# Patient Record
Sex: FEMALE | Race: WHITE | NOT HISPANIC OR LATINO | Employment: UNEMPLOYED | URBAN - METROPOLITAN AREA
[De-identification: names, ages, dates, MRNs, and addresses within clinical notes are randomized per-mention and may not be internally consistent; named-entity substitution may affect disease eponyms.]

---

## 2019-06-05 ENCOUNTER — HOSPITAL ENCOUNTER (OUTPATIENT)
Dept: GENERAL RADIOLOGY | Facility: HOSPITAL | Age: 39
Discharge: HOME | End: 2019-06-05
Attending: FAMILY MEDICINE

## 2019-08-23 ENCOUNTER — OFFICE VISIT CONVERTED (OUTPATIENT)
Dept: SURGERY | Facility: CLINIC | Age: 39
End: 2019-08-23
Attending: SURGERY

## 2019-09-05 ENCOUNTER — HOSPITAL ENCOUNTER (OUTPATIENT)
Dept: PERIOP | Facility: HOSPITAL | Age: 39
Setting detail: HOSPITAL OUTPATIENT SURGERY
Discharge: HOME | End: 2019-09-05
Attending: SURGERY

## 2019-09-20 ENCOUNTER — OFFICE VISIT CONVERTED (OUTPATIENT)
Dept: SURGERY | Facility: CLINIC | Age: 39
End: 2019-09-20
Attending: SURGERY

## 2020-10-26 ENCOUNTER — HOSPITAL ENCOUNTER (OUTPATIENT)
Dept: MRI IMAGING | Facility: HOSPITAL | Age: 40
Discharge: HOME | End: 2020-10-26
Attending: FAMILY MEDICINE

## 2020-10-28 ENCOUNTER — HOSPITAL ENCOUNTER (OUTPATIENT)
Dept: CARDIOLOGY | Facility: HOSPITAL | Age: 40
Discharge: HOME | End: 2020-10-28
Attending: FAMILY MEDICINE

## 2021-05-15 VITALS — RESPIRATION RATE: 16 BRPM | WEIGHT: 256.5 LBS | BODY MASS INDEX: 40.26 KG/M2 | HEIGHT: 67 IN

## 2021-05-15 VITALS — HEIGHT: 67 IN | WEIGHT: 255 LBS | BODY MASS INDEX: 40.02 KG/M2 | RESPIRATION RATE: 16 BRPM

## 2021-08-16 ENCOUNTER — HOSPITAL ENCOUNTER (EMERGENCY)
Facility: HOSPITAL | Age: 41
Discharge: HOME | End: 2021-08-16
Attending: EMERGENCY MEDICINE | Admitting: EMERGENCY MEDICINE

## 2021-08-16 VITALS
TEMPERATURE: 98.7 F | HEART RATE: 82 BPM | BODY MASS INDEX: 38.41 KG/M2 | HEIGHT: 67 IN | DIASTOLIC BLOOD PRESSURE: 82 MMHG | RESPIRATION RATE: 18 BRPM | SYSTOLIC BLOOD PRESSURE: 112 MMHG | OXYGEN SATURATION: 95 % | WEIGHT: 244.71 LBS

## 2021-08-16 DIAGNOSIS — T78.40XA ALLERGIC DISORDER, INITIAL ENCOUNTER: Primary | ICD-10-CM

## 2021-08-16 PROCEDURE — 96375 TX/PRO/DX INJ NEW DRUG ADDON: CPT

## 2021-08-16 PROCEDURE — 94640 AIRWAY INHALATION TREATMENT: CPT

## 2021-08-16 PROCEDURE — 25010000002 METHYLPREDNISOLONE PER 125 MG

## 2021-08-16 PROCEDURE — 94799 UNLISTED PULMONARY SVC/PX: CPT

## 2021-08-16 PROCEDURE — 99283 EMERGENCY DEPT VISIT LOW MDM: CPT

## 2021-08-16 PROCEDURE — 96374 THER/PROPH/DIAG INJ IV PUSH: CPT

## 2021-08-16 PROCEDURE — 25010000002 DIPHENHYDRAMINE PER 50 MG

## 2021-08-16 RX ORDER — DOXYCYCLINE HYCLATE 100 MG/1
100 TABLET, DELAYED RELEASE ORAL 2 TIMES DAILY
Qty: 20 TABLET | Refills: 0 | Status: SHIPPED | OUTPATIENT
Start: 2021-08-16 | End: 2021-11-15

## 2021-08-16 RX ORDER — SERTRALINE HYDROCHLORIDE 25 MG/1
25 TABLET, FILM COATED ORAL DAILY
COMMUNITY
End: 2021-11-15 | Stop reason: SDUPTHER

## 2021-08-16 RX ORDER — METHYLPREDNISOLONE SODIUM SUCCINATE 125 MG/2ML
125 INJECTION, POWDER, LYOPHILIZED, FOR SOLUTION INTRAMUSCULAR; INTRAVENOUS ONCE
Status: COMPLETED | OUTPATIENT
Start: 2021-08-16 | End: 2021-08-16

## 2021-08-16 RX ORDER — FAMOTIDINE 20 MG/1
20 TABLET, FILM COATED ORAL 2 TIMES DAILY
Qty: 20 TABLET | Refills: 0 | Status: SHIPPED | OUTPATIENT
Start: 2021-08-16 | End: 2021-11-15

## 2021-08-16 RX ORDER — DIPHENHYDRAMINE HCL 50 MG
50 CAPSULE ORAL EVERY 6 HOURS PRN
Qty: 30 CAPSULE | Refills: 0 | Status: SHIPPED | OUTPATIENT
Start: 2021-08-16 | End: 2021-11-15

## 2021-08-16 RX ORDER — METHYLPREDNISOLONE SODIUM SUCCINATE 125 MG/2ML
INJECTION, POWDER, LYOPHILIZED, FOR SOLUTION INTRAMUSCULAR; INTRAVENOUS
Status: COMPLETED
Start: 2021-08-16 | End: 2021-08-16

## 2021-08-16 RX ORDER — DIPHENHYDRAMINE HYDROCHLORIDE 50 MG/ML
50 INJECTION INTRAMUSCULAR; INTRAVENOUS ONCE
Status: COMPLETED | OUTPATIENT
Start: 2021-08-16 | End: 2021-08-16

## 2021-08-16 RX ORDER — PREDNISONE 20 MG/1
20 TABLET ORAL 2 TIMES DAILY
Qty: 14 TABLET | Refills: 0 | Status: SHIPPED | OUTPATIENT
Start: 2021-08-16 | End: 2021-11-15

## 2021-08-16 RX ORDER — FAMOTIDINE 10 MG/ML
20 INJECTION, SOLUTION INTRAVENOUS ONCE
Status: COMPLETED | OUTPATIENT
Start: 2021-08-16 | End: 2021-08-16

## 2021-08-16 RX ORDER — ALBUTEROL SULFATE 2.5 MG/3ML
2.5 SOLUTION RESPIRATORY (INHALATION) ONCE
Status: COMPLETED | OUTPATIENT
Start: 2021-08-16 | End: 2021-08-16

## 2021-08-16 RX ORDER — ASPIRIN 81 MG/1
81 TABLET, CHEWABLE ORAL DAILY
COMMUNITY

## 2021-08-16 RX ORDER — DIPHENHYDRAMINE HYDROCHLORIDE 50 MG/ML
INJECTION INTRAMUSCULAR; INTRAVENOUS
Status: COMPLETED
Start: 2021-08-16 | End: 2021-08-16

## 2021-08-16 RX ORDER — METOPROLOL TARTRATE 100 MG/1
100 TABLET ORAL 2 TIMES DAILY
COMMUNITY
End: 2021-11-15

## 2021-08-16 RX ADMIN — METHYLPREDNISOLONE SODIUM SUCCINATE 125 MG: 125 INJECTION, POWDER, FOR SOLUTION INTRAMUSCULAR; INTRAVENOUS at 15:09

## 2021-08-16 RX ADMIN — METHYLPREDNISOLONE SODIUM SUCCINATE 125 MG: 125 INJECTION, POWDER, LYOPHILIZED, FOR SOLUTION INTRAMUSCULAR; INTRAVENOUS at 15:09

## 2021-08-16 RX ADMIN — DIPHENHYDRAMINE HYDROCHLORIDE 50 MG: 50 INJECTION, SOLUTION INTRAMUSCULAR; INTRAVENOUS at 15:10

## 2021-08-16 RX ADMIN — DIPHENHYDRAMINE HYDROCHLORIDE 50 MG: 50 INJECTION INTRAMUSCULAR; INTRAVENOUS at 15:10

## 2021-08-16 RX ADMIN — FAMOTIDINE 20 MG: 10 INJECTION INTRAVENOUS at 15:30

## 2021-08-16 RX ADMIN — ALBUTEROL SULFATE 2.5 MG: 2.5 SOLUTION RESPIRATORY (INHALATION) at 15:46

## 2021-08-16 NOTE — ED PROVIDER NOTES
"Time: 2:56 PM EDT  Arrived by: private vehicle; accompanied by family   Chief Complaint: ALLERGIC REACTION   History provided by: pt  History is limited by: N/A    History of Present Illness:  Patient is a 41 y.o. year old female that presents to the emergency department with ALLERGIC REACTION. This started today and is still present and constant. It is described as moderate in severity. Nothing improves or worsens symptoms.     Pt has skin rash and states that she feels like her throat is \"tightening up\". She c/o SOB. Pt took Bactrim at 13:30 today. She notes that she has taken it before with no issues.     Pt was seen by her PCP today for a sore throat and was started on Bactrim.     Pt has hx of asthma. Pt is a smoker.       History provided by:  Patient      Similar Symptoms Previously: no  Recently seen: Pt was seen in PCP's office today.     Patient Care Team  Primary Care Provider: Deion Wang MD     Past Medical History:     Allergies   Allergen Reactions   • Latex Itching     Past Medical History:   Diagnosis Date   • Asthma    • Cellulitis      History reviewed. No pertinent surgical history.  History reviewed. No pertinent family history.    Home Medications:  Prior to Admission medications    Medication Sig Start Date End Date Taking? Authorizing Provider   aspirin 81 MG chewable tablet Chew 81 mg Daily.   Yes ProviderAnnabel MD   metoprolol tartrate (LOPRESSOR) 100 MG tablet Take 100 mg by mouth 2 (Two) Times a Day.   Yes ProviderAnnabel MD   sertraline (Zoloft) 25 MG tablet Take 25 mg by mouth Daily.   Yes ProviderAnnabel MD        Social History:   PT  reports that she has been smoking. She has been smoking about 0.50 packs per day. She has never used smokeless tobacco. She reports previous alcohol use. She reports current drug use. Drug: Marijuana.    Record Review:  I have reviewed the patient's records in [facility].     Review of Systems  Review of Systems   Constitutional: " "Negative for chills, diaphoresis and fever.   HENT: Positive for sore throat. Negative for ear discharge and nosebleeds.    Eyes: Negative for photophobia.   Respiratory: Positive for shortness of breath.    Cardiovascular: Negative for chest pain.   Gastrointestinal: Negative for diarrhea, nausea and vomiting.   Genitourinary: Negative for dysuria.   Musculoskeletal: Negative for back pain and neck pain.   Skin: Positive for rash.   Neurological: Negative for headaches.        Physical Exam  /82   Pulse 82   Temp 98.7 °F (37.1 °C) (Oral)   Resp 18   Ht 170.2 cm (67\")   Wt 111 kg (244 lb 11.4 oz)   SpO2 95%   BMI 38.33 kg/m²     Physical Exam  Vitals and nursing note reviewed.   Constitutional:       General: She is not in acute distress.     Appearance: Normal appearance.   HENT:      Head: Normocephalic and atraumatic.      Nose: Nose normal.   Eyes:      General: No scleral icterus.  Cardiovascular:      Rate and Rhythm: Normal rate and regular rhythm.      Heart sounds: Normal heart sounds.   Pulmonary:      Effort: Pulmonary effort is normal. No respiratory distress.      Breath sounds: Wheezing present.   Abdominal:      Palpations: Abdomen is soft.      Tenderness: There is no abdominal tenderness.   Musculoskeletal:         General: Normal range of motion.      Cervical back: Neck supple.      Right lower leg: No edema.      Left lower leg: No edema.   Skin:     General: Skin is warm and dry.      Comments: diffuse macular blanching erythema    Neurological:      General: No focal deficit present.      Mental Status: She is alert and oriented to person, place, and time.      Sensory: No sensory deficit.      Motor: No weakness.                  ED Course  /82   Pulse 82   Temp 98.7 °F (37.1 °C) (Oral)   Resp 18   Ht 170.2 cm (67\")   Wt 111 kg (244 lb 11.4 oz)   SpO2 95%   BMI 38.33 kg/m²   No results found for this or any previous visit.  Medications   diphenhydrAMINE (BENADRYL) " injection 50 mg (50 mg Intravenous Given 8/16/21 1510)   methylPREDNISolone sodium succinate (SOLU-Medrol) injection 125 mg (125 mg Intravenous Given 8/16/21 1509)   famotidine (PEPCID) injection 20 mg (20 mg Intravenous Given 8/16/21 1530)   albuterol (PROVENTIL) nebulizer solution 0.083% 2.5 mg/3mL (2.5 mg Nebulization Given 8/16/21 1546)     No results found.    Procedures/EKGs:  Procedures      Medical Decision Making:                     MDM  Number of Diagnoses or Management Options  Allergic disorder, initial encounter  Diagnosis management comments: Patient presents with rash and some difficulty breathing that began after taking her first dose of Bactrim for facial infection.  Differential considerations include anaphylaxis versus urticaria versus angioedema.  She received IV steroids and antihistamines with symptomatic improvement and resolution was discharged stable with steroids and antihistamines prescribed.  She is to return for worsening dyspnea other concerns she is advised to discontinue the Bactrim.    Risk of Complications, Morbidity, and/or Mortality  Presenting problems: high    Patient Progress  Patient progress: resolved       Final diagnoses:   Allergic disorder, initial encounter        Disposition:  ED Disposition     ED Disposition Condition Comment    Discharge Stable           Documentation assistance provided by Victoria Hickey acting as scribe for Jhon Scanlon MD. Information recorded by the scribe was done at my direction and has been verified and validated by me.         Victoria Hickey  08/16/21 0751       Jhon Scanlon MD  08/16/21 1930

## 2021-08-16 NOTE — ED TRIAGE NOTES
"Patient came into the ED today because she started having an allergic reaction to Bactrim. Patient states, \"I took bactrim @ 1:30pm today. I started itching all over, my feet are swelling, and my throat feels like its tightening up.\"  "

## 2021-11-09 PROBLEM — N64.52 NIPPLE DISCHARGE: Status: ACTIVE | Noted: 2021-11-09

## 2021-11-09 PROBLEM — J45.909 ASTHMA: Status: ACTIVE | Noted: 2021-11-09

## 2021-11-09 PROBLEM — N63.0 BREAST MASS: Status: ACTIVE | Noted: 2021-11-09

## 2021-11-09 PROBLEM — M47.816 LUMBAR FACET ARTHROPATHY: Status: ACTIVE | Noted: 2017-07-13

## 2021-11-15 ENCOUNTER — OFFICE VISIT (OUTPATIENT)
Dept: FAMILY MEDICINE CLINIC | Facility: CLINIC | Age: 41
End: 2021-11-15

## 2021-11-15 VITALS
BODY MASS INDEX: 42.97 KG/M2 | HEIGHT: 67 IN | DIASTOLIC BLOOD PRESSURE: 74 MMHG | SYSTOLIC BLOOD PRESSURE: 126 MMHG | HEART RATE: 86 BPM | WEIGHT: 273.8 LBS | TEMPERATURE: 98.7 F | OXYGEN SATURATION: 96 %

## 2021-11-15 DIAGNOSIS — Z12.31 ENCOUNTER FOR SCREENING MAMMOGRAM FOR MALIGNANT NEOPLASM OF BREAST: ICD-10-CM

## 2021-11-15 DIAGNOSIS — R73.01 IMPAIRED FASTING GLUCOSE: ICD-10-CM

## 2021-11-15 DIAGNOSIS — F41.9 ANXIETY: ICD-10-CM

## 2021-11-15 DIAGNOSIS — R23.2 HOT FLASHES: ICD-10-CM

## 2021-11-15 DIAGNOSIS — I10 PRIMARY HYPERTENSION: Primary | ICD-10-CM

## 2021-11-15 DIAGNOSIS — E53.8 VITAMIN B12 DEFICIENCY: ICD-10-CM

## 2021-11-15 DIAGNOSIS — R60.9 PERIPHERAL EDEMA: ICD-10-CM

## 2021-11-15 PROBLEM — R60.0 PERIPHERAL EDEMA: Status: ACTIVE | Noted: 2021-11-15

## 2021-11-15 LAB
CHOLEST SERPL-MCNC: 177 MG/DL (ref 0–200)
HBA1C MFR BLD: 6.6 % (ref 4.8–5.6)
HDLC SERPL-MCNC: 35 MG/DL (ref 40–60)
LDLC SERPL CALC-MCNC: 121 MG/DL (ref 0–100)
LDLC/HDLC SERPL: 3.39 {RATIO}
TRIGL SERPL-MCNC: 117 MG/DL (ref 0–150)
VLDLC SERPL-MCNC: 21 MG/DL (ref 5–40)

## 2021-11-15 PROCEDURE — 80061 LIPID PANEL: CPT | Performed by: NURSE PRACTITIONER

## 2021-11-15 PROCEDURE — 82746 ASSAY OF FOLIC ACID SERUM: CPT | Performed by: NURSE PRACTITIONER

## 2021-11-15 PROCEDURE — 83036 HEMOGLOBIN GLYCOSYLATED A1C: CPT | Performed by: NURSE PRACTITIONER

## 2021-11-15 PROCEDURE — 99204 OFFICE O/P NEW MOD 45 MIN: CPT | Performed by: NURSE PRACTITIONER

## 2021-11-15 PROCEDURE — 82607 VITAMIN B-12: CPT | Performed by: NURSE PRACTITIONER

## 2021-11-15 PROCEDURE — 80053 COMPREHEN METABOLIC PANEL: CPT | Performed by: NURSE PRACTITIONER

## 2021-11-15 PROCEDURE — 36415 COLL VENOUS BLD VENIPUNCTURE: CPT | Performed by: NURSE PRACTITIONER

## 2021-11-15 RX ORDER — SPIRONOLACTONE 25 MG/1
25 TABLET ORAL DAILY
Qty: 30 TABLET | Refills: 2 | Status: SHIPPED | OUTPATIENT
Start: 2021-11-15 | End: 2022-02-28

## 2021-11-15 RX ORDER — SYRINGE W-NEEDLE,DISPOSAB,3 ML 25GX5/8"
1 SYRINGE, EMPTY DISPOSABLE MISCELLANEOUS
Qty: 3 EACH | Refills: 3 | Status: SHIPPED | OUTPATIENT
Start: 2021-11-15 | End: 2022-06-09 | Stop reason: SDUPTHER

## 2021-11-15 RX ORDER — METOPROLOL SUCCINATE 100 MG/1
100 TABLET, EXTENDED RELEASE ORAL DAILY
Qty: 90 TABLET | Refills: 1 | Status: SHIPPED | OUTPATIENT
Start: 2021-11-15 | End: 2021-12-10

## 2021-11-15 RX ORDER — ALBUTEROL SULFATE 90 UG/1
2 AEROSOL, METERED RESPIRATORY (INHALATION) EVERY 4 HOURS PRN
COMMUNITY
End: 2023-03-17 | Stop reason: SDUPTHER

## 2021-11-15 RX ORDER — CYANOCOBALAMIN 1000 UG/ML
1000 INJECTION, SOLUTION INTRAMUSCULAR; SUBCUTANEOUS
Qty: 3 ML | Refills: 3 | Status: SHIPPED | OUTPATIENT
Start: 2021-11-15 | End: 2022-12-07 | Stop reason: SDUPTHER

## 2021-11-15 RX ORDER — CYANOCOBALAMIN 1000 UG/ML
1000 INJECTION, SOLUTION INTRAMUSCULAR; SUBCUTANEOUS ONCE
COMMUNITY
End: 2021-11-15 | Stop reason: SDUPTHER

## 2021-11-15 NOTE — PROGRESS NOTES
"Chief Complaint  Establish Care and Hypertension    Subjective          Nadiya Rodríguez presents to [facility]  History of Present Illness  She presents today as a new patient to establish care.  Her previous PCP was Dr. Wang who is retiring.    She states that earlier this year she passed out at home and her  found her when he returned home.  She said they thought she might of had a stroke because it is affected her left side but they were unable to determine the cause.  They believe that she was having anxiety and she was started on Zoloft which she says has helped.  She is currently on Zoloft 25 mg daily and states it is helping.  She does believe that the dose could be increased some.  She states it did find that she had low vitamin B12, her B12 level was 248.  She was started on vitamin B12 injections and was given them to her self.  She states that neurology would not refill her B12 until she had some levels drawn so she has not had a vitamin B12 injection for about 6 weeks.  She states that the vitamin B12 has seemed to help her symptoms.  She does still complain of some \"brain fog.\"    She is complaining of some swelling in her left lower extremity and mildly in her hands.  She states that she was tried on hydrochlorothiazide in the past and she did not tolerate that.  She also has an allergy to Lasix.    Hypertension: She is currently stable on metoprolol  mg daily.  Her blood pressure today is 126/74.  She states she does have a history of a heart murmur.    She has a history of asthma and uses albuterol inhaler only as needed.    I reviewed her previous labs from 4/20/2021.  Her A1c was noted to be 5.8%.  I discussed with her that she has borderline diabetic, she was unaware.    She states she has an umbilical hernia just below her umbilicus for the past 18 years.  She states is recently gotten worse with pain but she does not want to be referred " "at this time for surgery.    She is also complaining of hot flashes lately.  She is wanting to know what kind of treatment she can get for this.  She states she does not really want to do hormone therapy.  Her last Pap smear was 3 years ago and was normal.  Her last mammogram was 3 to 4 years ago.  She does have a family history of breast cancer.  Objective   Vital Signs:   /74   Pulse 86   Temp 98.7 °F (37.1 °C)   Ht 170.2 cm (67\")   Wt 124 kg (273 lb 12.8 oz)   SpO2 96%   BMI 42.88 kg/m²     Physical Exam  Vitals reviewed.   Constitutional:       Appearance: Normal appearance. She is well-developed.   Neck:      Thyroid: No thyroid mass, thyromegaly or thyroid tenderness.   Cardiovascular:      Rate and Rhythm: Normal rate and regular rhythm.      Heart sounds: No murmur heard.  No friction rub. No gallop.    Pulmonary:      Effort: Pulmonary effort is normal.      Breath sounds: Normal breath sounds. No wheezing or rhonchi.   Abdominal:      Hernia: A hernia is present. Hernia is present in the umbilical area.      Comments: Small umbilical hernia noted just below umbilicus, mild tenderness on palpation.   Musculoskeletal:      Right lower le+ Edema present.      Left lower le+ Edema present.   Lymphadenopathy:      Cervical: No cervical adenopathy.   Skin:     General: Skin is warm and dry.   Neurological:      Mental Status: She is alert and oriented to person, place, and time.      Cranial Nerves: No cranial nerve deficit.   Psychiatric:         Mood and Affect: Mood and affect normal.         Behavior: Behavior normal.         Thought Content: Thought content normal. Thought content does not include homicidal or suicidal ideation.         Judgment: Judgment normal.        Result Review :                 Assessment and Plan    Diagnoses and all orders for this visit:    1. Primary hypertension (Primary)  Assessment & Plan:  Hypertension is improving with treatment.  Continue current " "treatment regimen.  Continue current medications.  Blood pressure will be reassessed in 4 weeks.    Orders:  -     Comprehensive Metabolic Panel  -     Lipid Panel    2. Impaired fasting glucose  Assessment & Plan:  Borderline diabetes is newly identified, stable.  Will check A1c with labs today.  We discussed diet low in carbs and sugars.    Orders:  -     Hemoglobin A1c    3. Peripheral edema  Assessment & Plan:  Mild peripheral edema noted, will start her on spironolactone 25 mg daily.  Will reassess on follow-up.      4. Anxiety  Assessment & Plan:  Anxiety with some improvement on Zoloft 25 mg, will increase dose to 50 mg.  Will reassess in 4 weeks.      5. Vitamin B12 deficiency  Assessment & Plan:  We will check B12 levels today with her labs.  I recommend that she stay on the vitamin B12 injections every month, refill sent to her pharmacy.    Orders:  -     Vitamin B12 & Folate    6. Hot flashes  Assessment & Plan:  I discussed with her she can try over-the-counter black cohosh.  Recommended to not drink hot drinks, drink cold drinks, and to dress in layers.      7. Encounter for screening mammogram for malignant neoplasm of breast  -     Mammo Screening Digital Tomosynthesis Bilateral With CAD; Future    Other orders  -     cyanocobalamin 1000 MCG/ML injection; Inject 1 mL into the appropriate muscle as directed by prescriber Every 28 (Twenty-Eight) Days.  Dispense: 3 mL; Refill: 3  -     Syringe 27G X 1-1/4\" 3 ML misc; 1 each Every 28 (Twenty-Eight) Days. For B12 injection  Dispense: 3 each; Refill: 3  -     spironolactone (Aldactone) 25 MG tablet; Take 1 tablet by mouth Daily.  Dispense: 30 tablet; Refill: 2  -     metoprolol succinate XL (TOPROL-XL) 100 MG 24 hr tablet; Take 1 tablet by mouth Daily.  Dispense: 90 tablet; Refill: 1  -     sertraline (Zoloft) 50 MG tablet; Take 1 tablet by mouth Daily.  Dispense: 90 tablet; Refill: 1      Follow Up   Return in about 4 weeks (around 12/13/2021) for " pap.  Patient was given instructions and counseling regarding her condition or for health maintenance advice. Please see specific information pulled into the AVS if appropriate.

## 2021-11-15 NOTE — PATIENT INSTRUCTIONS
Edema    Edema is when you have too much fluid in your body or under your skin. Edema may make your legs, feet, and ankles swell up. Swelling is also common in looser tissues, like around your eyes. This is a common condition. It gets more common as you get older. There are many possible causes of edema. Eating too much salt (sodium) and being on your feet or sitting for a long time can cause edema in your legs, feet, and ankles. Hot weather may make edema worse.  Edema is usually painless. Your skin may look swollen or shiny.  Follow these instructions at home:  · Keep the swollen body part raised (elevated) above the level of your heart when you are sitting or lying down.  · Do not sit still or stand for a long time.  · Do not wear tight clothes. Do not wear garters on your upper legs.  · Exercise your legs. This can help the swelling go down.  · Wear elastic bandages or support stockings as told by your doctor.  · Eat a low-salt (low-sodium) diet to reduce fluid as told by your doctor.  · Depending on the cause of your swelling, you may need to limit how much fluid you drink (fluid restriction).  · Take over-the-counter and prescription medicines only as told by your doctor.  Contact a doctor if:  · Treatment is not working.  · You have heart, liver, or kidney disease and have symptoms of edema.  · You have sudden and unexplained weight gain.  Get help right away if:  · You have shortness of breath or chest pain.  · You cannot breathe when you lie down.  · You have pain, redness, or warmth in the swollen areas.  · You have heart, liver, or kidney disease and get edema all of a sudden.  · You have a fever and your symptoms get worse all of a sudden.  Summary  · Edema is when you have too much fluid in your body or under your skin.  · Edema may make your legs, feet, and ankles swell up. Swelling is also common in looser tissues, like around your eyes.  · Raise (elevate) the swollen body part above the level of your  heart when you are sitting or lying down.  · Follow your doctor's instructions about diet and how much fluid you can drink (fluid restriction).  This information is not intended to replace advice given to you by your health care provider. Make sure you discuss any questions you have with your health care provider.  Document Revised: 12/21/2018 Document Reviewed: 01/05/2018  [employer] Patient Education © 2021 [employer]    Peripheral Edema    Peripheral edema is swelling that is caused by a buildup of fluid. Peripheral edema most often affects the lower legs, ankles, and feet. It can also develop in the arms, hands, and face. The area of the body that has peripheral edema will look swollen. It may also feel heavy or warm. Your clothes may start to feel tight. Pressing on the area may make a temporary dent in your skin. You may not be able to move your swollen arm or leg as much as usual.  There are many causes of peripheral edema. It can happen because of a complication of other conditions such as congestive heart failure, kidney disease, or a problem with your blood circulation. It also can be a side effect of certain medicines or because of an infection. It often happens to women during pregnancy. Sometimes, the cause is not known.  Follow these instructions at home:  Managing pain, stiffness, and swelling    · Raise (elevate) your legs while you are sitting or lying down.  · Move around often to prevent stiffness and to lessen swelling.  · Do not sit or stand for long periods of time.  · Wear support stockings as told by your health care provider.    Medicines  · Take over-the-counter and prescription medicines only as told by your health care provider.  · Your health care provider may prescribe medicine to help your body get rid of excess water (diuretic).  General instructions  · Pay attention to any changes in your symptoms.  · Follow instructions from your health care provider about limiting salt (sodium) in  your diet. Sometimes, eating less salt may reduce swelling.  · Moisturize skin daily to help prevent skin from cracking and draining.  · Keep all follow-up visits as told by your health care provider. This is important.  Contact a health care provider if you have:  · A fever.  · Edema that starts suddenly or is getting worse, especially if you are pregnant or have a medical condition.  · Swelling in only one leg.  · Increased swelling, redness, or pain in one or both of your legs.  · Drainage or sores at the area where you have edema.  Get help right away if you:  · Develop shortness of breath, especially when you are lying down.  · Have pain in your chest or abdomen.  · Feel weak.  · Feel faint.  Summary  · Peripheral edema is swelling that is caused by a buildup of fluid. Peripheral edema most often affects the lower legs, ankles, and feet.  · Move around often to prevent stiffness and to lessen swelling. Do not sit or stand for long periods of time.  · Pay attention to any changes in your symptoms.  · Contact a health care provider if you have edema that starts suddenly or is getting worse, especially if you are pregnant or have a medical condition.  · Get help right away if you develop shortness of breath, especially when lying down.  This information is not intended to replace advice given to you by your health care provider. Make sure you discuss any questions you have with your health care provider.  Document Revised: 09/11/2019 Document Reviewed: 09/11/2019  [employer] Patient Education © 2021 [employer]

## 2021-11-16 LAB
ALBUMIN SERPL-MCNC: 4.3 G/DL (ref 3.5–5.2)
ALBUMIN/GLOB SERPL: 1.7 G/DL
ALP SERPL-CCNC: 70 U/L (ref 39–117)
ALT SERPL W P-5'-P-CCNC: 26 U/L (ref 1–33)
ANION GAP SERPL CALCULATED.3IONS-SCNC: 10.3 MMOL/L (ref 5–15)
AST SERPL-CCNC: 17 U/L (ref 1–32)
BILIRUB SERPL-MCNC: <0.2 MG/DL (ref 0–1.2)
BUN SERPL-MCNC: 12 MG/DL (ref 6–20)
BUN/CREAT SERPL: 14.6 (ref 7–25)
CALCIUM SPEC-SCNC: 9 MG/DL (ref 8.6–10.5)
CHLORIDE SERPL-SCNC: 105 MMOL/L (ref 98–107)
CO2 SERPL-SCNC: 23.7 MMOL/L (ref 22–29)
CREAT SERPL-MCNC: 0.82 MG/DL (ref 0.57–1)
FOLATE SERPL-MCNC: 6.4 NG/ML (ref 4.78–24.2)
GFR SERPL CREATININE-BSD FRML MDRD: 76 ML/MIN/1.73
GLOBULIN UR ELPH-MCNC: 2.6 GM/DL
GLUCOSE SERPL-MCNC: 108 MG/DL (ref 65–99)
POTASSIUM SERPL-SCNC: 4.2 MMOL/L (ref 3.5–5.2)
PROT SERPL-MCNC: 6.9 G/DL (ref 6–8.5)
SODIUM SERPL-SCNC: 139 MMOL/L (ref 136–145)
VIT B12 BLD-MCNC: 469 PG/ML (ref 211–946)

## 2021-11-16 RX ORDER — METFORMIN HYDROCHLORIDE 500 MG/1
500 TABLET, EXTENDED RELEASE ORAL
Qty: 30 TABLET | Refills: 5 | Status: SHIPPED | OUTPATIENT
Start: 2021-11-16 | End: 2021-12-17

## 2021-11-16 NOTE — ASSESSMENT & PLAN NOTE
Borderline diabetes is newly identified, stable.  Will check A1c with labs today.  We discussed diet low in carbs and sugars.

## 2021-11-16 NOTE — ASSESSMENT & PLAN NOTE
Hypertension is improving with treatment.  Continue current treatment regimen.  Continue current medications.  Blood pressure will be reassessed in 4 weeks.

## 2021-11-16 NOTE — ASSESSMENT & PLAN NOTE
I discussed with her she can try over-the-counter black cohosh.  Recommended to not drink hot drinks, drink cold drinks, and to dress in layers.

## 2021-11-16 NOTE — ASSESSMENT & PLAN NOTE
Anxiety with some improvement on Zoloft 25 mg, will increase dose to 50 mg.  Will reassess in 4 weeks.

## 2021-11-16 NOTE — ASSESSMENT & PLAN NOTE
Mild peripheral edema noted, will start her on spironolactone 25 mg daily.  Will reassess on follow-up.

## 2021-11-16 NOTE — ASSESSMENT & PLAN NOTE
We will check B12 levels today with her labs.  I recommend that she stay on the vitamin B12 injections every month, refill sent to her pharmacy.

## 2021-11-29 ENCOUNTER — TELEPHONE (OUTPATIENT)
Dept: FAMILY MEDICINE CLINIC | Facility: CLINIC | Age: 41
End: 2021-11-29

## 2021-11-29 NOTE — TELEPHONE ENCOUNTER
Left voicemessage for patient that the provider will not be available during her appt time and that we need to reschedule.

## 2021-12-09 ENCOUNTER — TELEPHONE (OUTPATIENT)
Dept: FAMILY MEDICINE CLINIC | Facility: CLINIC | Age: 41
End: 2021-12-09

## 2021-12-10 RX ORDER — METOPROLOL TARTRATE 100 MG/1
100 TABLET ORAL DAILY
COMMUNITY
End: 2021-12-10 | Stop reason: SDUPTHER

## 2021-12-10 RX ORDER — METOPROLOL TARTRATE 100 MG/1
100 TABLET ORAL DAILY
Qty: 90 TABLET | Refills: 1 | Status: SHIPPED | OUTPATIENT
Start: 2021-12-10 | End: 2022-06-09 | Stop reason: SDUPTHER

## 2021-12-10 NOTE — TELEPHONE ENCOUNTER
Caller: Nadiya Rodríguez    Relationship: Self    Best call back number: [phone]     Requested Prescriptions: METOPROLOL TARTRATE 100 MG  TAB LEG  TAKES ONCE A DAY   Requested Prescriptions         Pharmacy where request should be sent:    [address] - [phone]  - [phone] [facility]     PATIENT CALLING BACK, INFORMS PHARMACY HAS NOT RECEIVED YET     Additional details provided by patient:   PATIENT STATES THAT SHE PICKED UP A PRESCRIPTION THAT WAS FOR METOPROLOL  MG TAB ING PATIENT IS REQUESTING THE METOPROLOL TARTRATE 100 MG TAB LEG    Abdoul CUEVAS Rep   12/10/21 13:53 EST

## 2021-12-17 ENCOUNTER — OFFICE VISIT (OUTPATIENT)
Dept: FAMILY MEDICINE CLINIC | Facility: CLINIC | Age: 41
End: 2021-12-17

## 2021-12-17 VITALS
TEMPERATURE: 97.5 F | HEIGHT: 67 IN | HEART RATE: 75 BPM | DIASTOLIC BLOOD PRESSURE: 88 MMHG | BODY MASS INDEX: 39.96 KG/M2 | SYSTOLIC BLOOD PRESSURE: 140 MMHG | WEIGHT: 254.6 LBS | OXYGEN SATURATION: 98 %

## 2021-12-17 DIAGNOSIS — F41.9 ANXIETY: ICD-10-CM

## 2021-12-17 DIAGNOSIS — E11.9 CONTROLLED TYPE 2 DIABETES MELLITUS WITHOUT COMPLICATION, WITHOUT LONG-TERM CURRENT USE OF INSULIN (HCC): Primary | ICD-10-CM

## 2021-12-17 DIAGNOSIS — I10 PRIMARY HYPERTENSION: ICD-10-CM

## 2021-12-17 PROCEDURE — 99214 OFFICE O/P EST MOD 30 MIN: CPT | Performed by: NURSE PRACTITIONER

## 2021-12-17 NOTE — PROGRESS NOTES
"Chief Complaint  Hypertension    Subjective          Nadiya Damari Rodríguez presents to [facility]  History of Present Illness   Is here for follow-up on peripheral edema. She was started on spironolactone 25 mg daily last month.  Hypertension: Her blood pressure is high today at 162/98 right arm and 140/88  Left arm. She is on metoprolol tartrate 100 mg daily.  She states that she just started the spironolactone this past week.     She also is following up on anxiety. Her dose of Zoloft was increased from 25 to 50 mg on her last visit last month. She states she does not wake up during the night now.     On her labs last month her A1c had increased to 6.6%. I was going to start her on Metformin 500 mg once daily but she requested to work on her diet first.    Her vitamin B12 level had improved, taking vitamin B12 injections.    Objective   Vital Signs:   /88   Pulse 75   Temp 97.5 °F (36.4 °C)   Ht 170.2 cm (67\")   Wt 115 kg (254 lb 9.6 oz)   SpO2 98%   BMI 39.88 kg/m²     Physical Exam  Vitals reviewed.   Constitutional:       Appearance: Normal appearance. She is well-developed.   Neck:      Thyroid: No thyroid mass, thyromegaly or thyroid tenderness.   Cardiovascular:      Rate and Rhythm: Normal rate and regular rhythm.      Heart sounds: No murmur heard.  No friction rub. No gallop.    Pulmonary:      Effort: Pulmonary effort is normal.      Breath sounds: Normal breath sounds. No wheezing or rhonchi.   Lymphadenopathy:      Cervical: No cervical adenopathy.   Skin:     General: Skin is warm and dry.   Neurological:      Mental Status: She is alert and oriented to person, place, and time.      Cranial Nerves: No cranial nerve deficit.   Psychiatric:         Mood and Affect: Mood and affect normal.         Behavior: Behavior normal.         Thought Content: Thought content normal. Thought content does not include homicidal or suicidal ideation.         Judgment: " Judgment normal.        Result Review :                 Assessment and Plan    Diagnoses and all orders for this visit:    1. Controlled type 2 diabetes mellitus without complication, without long-term current use of insulin (HCC) (Primary)  Assessment & Plan:  Diabetes is improving with treatment.   Dietary recommendations for ADA diet.  I will have her get a glucometer to start checking her blood sugar every day and as needed.  Requested for her to bring blood sugar log on her next appointment.  Diabetes will be reassessed in 3 months.      2. Primary hypertension  Assessment & Plan:  Hypertension is worsening.  Continue current treatment regimen.  Dietary sodium restriction.  Stop smoking.  Continue current medications.  Patient just started spironolactone which should help decrease her blood pressure.  Blood pressure will be reassessed When she comes back for her Pap smear next month.      3. Anxiety  Assessment & Plan:  Anxiety is improving on Zoloft, patient is sleeping better at night.  We will continue Zoloft 50 mg daily.         Follow Up   Return for pap.  Patient was given instructions and counseling regarding her condition or for health maintenance advice. Please see specific information pulled into the AVS if appropriate.

## 2021-12-17 NOTE — PATIENT INSTRUCTIONS
Diabetes Mellitus and Nutrition, Adult  When you have diabetes, or diabetes mellitus, it is very important to have healthy eating habits because your blood sugar (glucose) levels are greatly affected by what you eat and drink. Eating healthy foods in the right amounts, at about the same times every day, can help you:  · Control your blood glucose.  · Lower your risk of heart disease.  · Improve your blood pressure.  · Reach or maintain a healthy weight.  What can affect my meal plan?  Every person with diabetes is different, and each person has different needs for a meal plan. Your health care provider may recommend that you work with a dietitian to make a meal plan that is best for you. Your meal plan may vary depending on factors such as:  · The calories you need.  · The medicines you take.  · Your weight.  · Your blood glucose, blood pressure, and cholesterol levels.  · Your activity level.  · Other health conditions you have, such as heart or kidney disease.  How do carbohydrates affect me?  Carbohydrates, also called carbs, affect your blood glucose level more than any other type of food. Eating carbs naturally raises the amount of glucose in your blood. Carb counting is a method for keeping track of how many carbs you eat. Counting carbs is important to keep your blood glucose at a healthy level, especially if you use insulin or take certain oral diabetes medicines.  It is important to know how many carbs you can safely have in each meal. This is different for every person. Your dietitian can help you calculate how many carbs you should have at each meal and for each snack.  How does alcohol affect me?  Alcohol can cause a sudden decrease in blood glucose (hypoglycemia), especially if you use insulin or take certain oral diabetes medicines. Hypoglycemia can be a life-threatening condition. Symptoms of hypoglycemia, such as sleepiness, dizziness, and confusion, are similar to symptoms of having too much  "alcohol.  · Do not drink alcohol if:  ? Your health care provider tells you not to drink.  ? You are pregnant, may be pregnant, or are planning to become pregnant.  · If you drink alcohol:  ? Do not drink on an empty stomach.  ? Limit how much you use to:  § 0-1 drink a day for women.  § 0-2 drinks a day for men.  ? Be aware of how much alcohol is in your drink. In the [place] one drink equals one 12 oz bottle of beer (355 mL), one 5 oz glass of wine (148 mL), or one 1½ oz glass of hard liquor (44 mL).  ? Keep yourself hydrated with water, diet soda, or unsweetened iced tea.  § Keep in mind that regular soda, juice, and other mixers may contain a lot of sugar and must be counted as carbs.  What are tips for following this plan?    Reading food labels  · Start by checking the serving size on the \"Nutrition Facts\" label of packaged foods and drinks. The amount of calories, carbs, fats, and other nutrients listed on the label is based on one serving of the item. Many items contain more than one serving per package.  · Check the total grams (g) of carbs in one serving. You can calculate the number of servings of carbs in one serving by dividing the total carbs by 15. For example, if a food has 30 g of total carbs per serving, it would be equal to 2 servings of carbs.  · Check the number of grams (g) of saturated fats and trans fats in one serving. Choose foods that have a low amount or none of these fats.  · Check the number of milligrams (mg) of salt (sodium) in one serving. Most people should limit total sodium intake to less than 2,300 mg per day.  · Always check the nutrition information of foods labeled as \"low-fat\" or \"nonfat.\" These foods may be higher in added sugar or refined carbs and should be avoided.  · Talk to your dietitian to identify your daily goals for nutrients listed on the label.  Shopping  · Avoid buying canned, pre-made, or processed foods. These foods tend to be high in fat, sodium, and added " sugar.  · Shop around the outside edge of the grocery store. This is where you will most often find fresh fruits and vegetables, bulk grains, fresh meats, and fresh dairy.  Cooking  · Use low-heat cooking methods, such as baking, instead of high-heat cooking methods like deep frying.  · Cook using healthy oils, such as olive, canola, or sunflower oil.  · Avoid cooking with butter, cream, or high-fat meats.  Meal planning  · Eat meals and snacks regularly, preferably at the same times every day. Avoid going long periods of time without eating.  · Eat foods that are high in fiber, such as fresh fruits, vegetables, beans, and whole grains. Talk with your dietitian about how many servings of carbs you can eat at each meal.  · Eat 4-6 oz (112-168 g) of lean protein each day, such as lean meat, chicken, fish, eggs, or tofu. One ounce (oz) of lean protein is equal to:  ? 1 oz (28 g) of meat, chicken, or fish.  ? 1 egg.  ? ¼ cup (62 g) of tofu.  · Eat some foods each day that contain healthy fats, such as avocado, nuts, seeds, and fish.  What foods should I eat?  Fruits  Berries. Apples. Oranges. Peaches. Apricots. Plums. Grapes. [place]. Papaya. Pomegranate. Kiwi. Cherries.  Vegetables  Lettuce. Spinach. Leafy greens, including kale, chard, reta greens, and mustard greens. Beets. Cauliflower. Cabbage. Broccoli. Carrots. Green beans. Tomatoes. Peppers. Onions. Cucumbers. [place] sprouts.  Grains  Whole grains, such as whole-wheat or whole-grain bread, crackers, tortillas, cereal, and pasta. Unsweetened oatmeal. Quinoa. Brown or wild rice.  Meats and other proteins  Seafood. Poultry without skin. Lean cuts of poultry and beef. Tofu. Nuts. Seeds.  Dairy  Low-fat or fat-free dairy products such as milk, yogurt, and cheese.  The items listed above may not be a complete list of foods and beverages you can eat. Contact a dietitian for more information.  What foods should I avoid?  Fruits  Fruits canned with  syrup.  Vegetables  Canned vegetables. Frozen vegetables with butter or cream sauce.  Grains  Refined white flour and flour products such as bread, pasta, snack foods, and cereals. Avoid all processed foods.  Meats and other proteins  Fatty cuts of meat. Poultry with skin. Breaded or fried meats. Processed meat. Avoid saturated fats.  Dairy  Full-fat yogurt, cheese, or milk.  Beverages  Sweetened drinks, such as soda or iced tea.  The items listed above may not be a complete list of foods and beverages you should avoid. Contact a dietitian for more information.  Questions to ask a health care provider  · Do I need to meet with a diabetes educator?  · Do I need to meet with a dietitian?  · What number can I call if I have questions?  · When are the best times to check my blood glucose?  Where to find more information:  · American Diabetes Association: diabetes.org  · Academy of Nutrition and Dietetics: www.eatright.org  · National [place] of Diabetes and Digestive and Kidney Diseases: www.niddk.nih.gov  · Association of Diabetes Care and Education Specialists: www.diabeteseducator.org  Summary  · It is important to have healthy eating habits because your blood sugar (glucose) levels are greatly affected by what you eat and drink.  · A healthy meal plan will help you control your blood glucose and maintain a healthy lifestyle.  · Your health care provider may recommend that you work with a dietitian to make a meal plan that is best for you.  · Keep in mind that carbohydrates (carbs) and alcohol have immediate effects on your blood glucose levels. It is important to count carbs and to use alcohol carefully.  This information is not intended to replace advice given to you by your health care provider. Make sure you discuss any questions you have with your health care provider.  Document Revised: 11/24/2020 Document Reviewed: 11/24/2020  [employer] Patient Education © 2021 [email or web address]      Diabetes Mellitus and  Standards of Medical Care  Living with and managing diabetes (diabetes mellitus) can be complicated. Your diabetes treatment may be managed by a team of health care providers, including:  · A physician who specializes in diabetes (endocrinologist). You might also have visits with a nurse practitioner or physician assistant.  · Nurses.  · A registered dietitian.  · A certified diabetes care and .  · An exercise specialist.  · A pharmacist.  · An eye doctor.  · A foot specialist (podiatrist).  · A dental care provider.  · A primary care provider.  · A mental health care provider.  How to manage your diabetes  You can do many things to successfully manage your diabetes. Your health care providers will follow guidelines to help you get the best quality of care. Here are general guidelines for your diabetes management plan. Your health care providers may give you more specific instructions.  Physical exams  When you are diagnosed with diabetes, and each year after that, your health care provider will ask about your medical and family history. You will have a physical exam, which may include:  · Measuring your height, weight, and body mass index (BMI).  · Checking your blood pressure. This will be done at every routine medical visit. Your target blood pressure may vary depending on your medical conditions, your age, and other factors.  · A thyroid exam.  · A skin exam.  · Screening for nerve damage (peripheral neuropathy). This may include checking the pulse in your legs and feet and the level of sensation in your hands and feet.  · A foot exam to inspect the structure and skin of your feet, including checking for cuts, bruises, redness, blisters, sores, or other problems.  · Screening for blood vessel (vascular) problems. This may include checking the pulse in your legs and feet and checking your temperature.  Blood tests  Depending on your treatment plan and your personal needs, you may have the  following tests:  · Hemoglobin A1C (HbA1C). This test provides information about blood sugar (glucose) control over the previous 2-3 months. It is used to adjust your treatment plan, if needed. This test will be done:  ? At least 2 times a year, if you are meeting your treatment goals.  ? 4 times a year, if you are not meeting your treatment goals or if your goals have changed.  · Lipid testing, including total cholesterol, LDL and HDL cholesterol, and triglyceride levels.  ? The goal for LDL is less than 100 mg/dL (5.5 mmol/L). If you are at high risk for complications, the goal is less than 70 mg/dL (3.9 mmol/L).  ? The goal for HDL is 40 mg/dL (2.2 mmol/L) or higher for men, and 50 mg/dL (2.8 mmol/L) or higher for women. An HDL cholesterol of 60 mg/dL (3.3 mmol/L) or higher gives some protection against heart disease.  ? The goal for triglycerides is less than 150 mg/dL (8.3 mmol/L).  · Liver function tests.  · Kidney function tests.  · Thyroid function tests.    Dental and eye exams    · Visit your dentist two times a year.  · If you have type 1 diabetes, your health care provider may recommend an eye exam within 5 years after you are diagnosed, and then once a year after your first exam.  ? For children with type 1 diabetes, the health care provider may recommend an eye exam when your child is age 11 or older and has had diabetes for 3-5 years. After the first exam, your child should get an eye exam once a year.  · If you have type 2 diabetes, your health care provider may recommend an eye exam as soon as you are diagnosed, and then every 1-2 years after your first exam.    Immunizations  · A yearly flu (influenza) vaccine is recommended annually for everyone 6 months or older. This is especially important if you have diabetes.  · The pneumonia (pneumococcal) vaccine is recommended for everyone 2 years or older who has diabetes. If you are age 65 or older, you may get the pneumonia vaccine as a series of two  separate shots.  · The hepatitis B vaccine is recommended for adults shortly after being diagnosed with diabetes.  Adults and children with diabetes should receive all other vaccines according to age-specific recommendations from the Centers for Disease Control and Prevention (CDC).  Mental and emotional health  Screening for symptoms of eating disorders, anxiety, and depression is recommended at the time of diagnosis and after as needed. If your screening shows that you have symptoms, you may need more evaluation. You may work with a mental health care provider.  Follow these instructions at home:  Treatment plan  You will monitor your blood glucose levels and may give yourself insulin. Your treatment plan will be reviewed at every medical visit. You and your health care provider will discuss:  · How you are taking your medicines, including insulin.  · Any side effects you have.  · Your blood glucose level target goals.  · How often you monitor your blood glucose level.  · Lifestyle habits, such as activity level and tobacco, alcohol, and substance use.  Education  Your health care provider will assess how well you are monitoring your blood glucose levels and whether you are taking your insulin and medicines correctly. He or she may refer you to:  · A certified diabetes care and  to manage your diabetes throughout your life, starting at diagnosis.  · A registered dietitian who can create and review your personal nutrition plan.  · An exercise specialist who can discuss your activity level and exercise plan.  General instructions  · Take over-the-counter and prescription medicines only as told by your health care provider.  · Keep all follow-up visits. This is important.  Where to find support  There are many diabetes support networks, including:  · American Diabetes Association (ADA): diabetes.org  · Defeat Diabetes Foundation: defeatdiabetes.org  Where to find more information  · American  Diabetes Association (ADA): www.diabetes.org  · Association of Diabetes Care & Education Specialists (ADCES): diabeteseducator.org  · International Diabetes Federation (IDF): idf.org  Summary  · Managing diabetes (diabetes mellitus) can be complicated. Your diabetes treatment may be managed by a team of health care providers.  · Your health care providers follow guidelines to help you get the best quality care.  · You should have physical exams, blood tests, blood pressure monitoring, immunizations, and screening tests regularly. Stay updated on how to manage your diabetes.  · Your health care providers may also give you more specific instructions based on your individual health.  This information is not intended to replace advice given to you by your health care provider. Make sure you discuss any questions you have with your health care provider.  Document Revised: 06/24/2021 Document Reviewed: 06/24/2021  [employer] Patient Education © 2021 [employer]    Diabetes Mellitus and Nutrition, Adult  When you have diabetes, or diabetes mellitus, it is very important to have healthy eating habits because your blood sugar (glucose) levels are greatly affected by what you eat and drink. Eating healthy foods in the right amounts, at about the same times every day, can help you:  · Control your blood glucose.  · Lower your risk of heart disease.  · Improve your blood pressure.  · Reach or maintain a healthy weight.  What can affect my meal plan?  Every person with diabetes is different, and each person has different needs for a meal plan. Your health care provider may recommend that you work with a dietitian to make a meal plan that is best for you. Your meal plan may vary depending on factors such as:  · The calories you need.  · The medicines you take.  · Your weight.  · Your blood glucose, blood pressure, and cholesterol levels.  · Your activity level.  · Other health conditions you have, such as heart or kidney  "disease.  How do carbohydrates affect me?  Carbohydrates, also called carbs, affect your blood glucose level more than any other type of food. Eating carbs naturally raises the amount of glucose in your blood. Carb counting is a method for keeping track of how many carbs you eat. Counting carbs is important to keep your blood glucose at a healthy level, especially if you use insulin or take certain oral diabetes medicines.  It is important to know how many carbs you can safely have in each meal. This is different for every person. Your dietitian can help you calculate how many carbs you should have at each meal and for each snack.  How does alcohol affect me?  Alcohol can cause a sudden decrease in blood glucose (hypoglycemia), especially if you use insulin or take certain oral diabetes medicines. Hypoglycemia can be a life-threatening condition. Symptoms of hypoglycemia, such as sleepiness, dizziness, and confusion, are similar to symptoms of having too much alcohol.  · Do not drink alcohol if:  ? Your health care provider tells you not to drink.  ? You are pregnant, may be pregnant, or are planning to become pregnant.  · If you drink alcohol:  ? Do not drink on an empty stomach.  ? Limit how much you use to:  § 0-1 drink a day for women.  § 0-2 drinks a day for men.  ? Be aware of how much alcohol is in your drink. In the [place] one drink equals one 12 oz bottle of beer (355 mL), one 5 oz glass of wine (148 mL), or one 1½ oz glass of hard liquor (44 mL).  ? Keep yourself hydrated with water, diet soda, or unsweetened iced tea.  § Keep in mind that regular soda, juice, and other mixers may contain a lot of sugar and must be counted as carbs.  What are tips for following this plan?    Reading food labels  · Start by checking the serving size on the \"Nutrition Facts\" label of packaged foods and drinks. The amount of calories, carbs, fats, and other nutrients listed on the label is based on one serving of the item. " "Many items contain more than one serving per package.  · Check the total grams (g) of carbs in one serving. You can calculate the number of servings of carbs in one serving by dividing the total carbs by 15. For example, if a food has 30 g of total carbs per serving, it would be equal to 2 servings of carbs.  · Check the number of grams (g) of saturated fats and trans fats in one serving. Choose foods that have a low amount or none of these fats.  · Check the number of milligrams (mg) of salt (sodium) in one serving. Most people should limit total sodium intake to less than 2,300 mg per day.  · Always check the nutrition information of foods labeled as \"low-fat\" or \"nonfat.\" These foods may be higher in added sugar or refined carbs and should be avoided.  · Talk to your dietitian to identify your daily goals for nutrients listed on the label.  Shopping  · Avoid buying canned, pre-made, or processed foods. These foods tend to be high in fat, sodium, and added sugar.  · Shop around the outside edge of the grocery store. This is where you will most often find fresh fruits and vegetables, bulk grains, fresh meats, and fresh dairy.  Cooking  · Use low-heat cooking methods, such as baking, instead of high-heat cooking methods like deep frying.  · Cook using healthy oils, such as olive, canola, or sunflower oil.  · Avoid cooking with butter, cream, or high-fat meats.  Meal planning  · Eat meals and snacks regularly, preferably at the same times every day. Avoid going long periods of time without eating.  · Eat foods that are high in fiber, such as fresh fruits, vegetables, beans, and whole grains. Talk with your dietitian about how many servings of carbs you can eat at each meal.  · Eat 4-6 oz (112-168 g) of lean protein each day, such as lean meat, chicken, fish, eggs, or tofu. One ounce (oz) of lean protein is equal to:  ? 1 oz (28 g) of meat, chicken, or fish.  ? 1 egg.  ? ¼ cup (62 g) of tofu.  · Eat some foods each " day that contain healthy fats, such as avocado, nuts, seeds, and fish.  What foods should I eat?  Fruits  Berries. Apples. Oranges. Peaches. Apricots. Plums. Grapes. [place]. Papaya. Pomegranate. Kiwi. Cherries.  Vegetables  Lettuce. Spinach. Leafy greens, including kale, chard, reta greens, and mustard greens. Beets. Cauliflower. Cabbage. Broccoli. Carrots. Green beans. Tomatoes. Peppers. Onions. Cucumbers. [place] sprouts.  Grains  Whole grains, such as whole-wheat or whole-grain bread, crackers, tortillas, cereal, and pasta. Unsweetened oatmeal. Quinoa. Brown or wild rice.  Meats and other proteins  Seafood. Poultry without skin. Lean cuts of poultry and beef. Tofu. Nuts. Seeds.  Dairy  Low-fat or fat-free dairy products such as milk, yogurt, and cheese.  The items listed above may not be a complete list of foods and beverages you can eat. Contact a dietitian for more information.  What foods should I avoid?  Fruits  Fruits canned with syrup.  Vegetables  Canned vegetables. Frozen vegetables with butter or cream sauce.  Grains  Refined white flour and flour products such as bread, pasta, snack foods, and cereals. Avoid all processed foods.  Meats and other proteins  Fatty cuts of meat. Poultry with skin. Breaded or fried meats. Processed meat. Avoid saturated fats.  Dairy  Full-fat yogurt, cheese, or milk.  Beverages  Sweetened drinks, such as soda or iced tea.  The items listed above may not be a complete list of foods and beverages you should avoid. Contact a dietitian for more information.  Questions to ask a health care provider  · Do I need to meet with a diabetes educator?  · Do I need to meet with a dietitian?  · What number can I call if I have questions?  · When are the best times to check my blood glucose?  Where to find more information:  · American Diabetes Association: diabetes.org  · Academy of Nutrition and Dietetics: www.eatright.org  · National [place] of Diabetes and Digestive and Kidney  Diseases: www.niddk.nih.gov  · Association of Diabetes Care and Education Specialists: www.diabeteseducator.org  Summary  · It is important to have healthy eating habits because your blood sugar (glucose) levels are greatly affected by what you eat and drink.  · A healthy meal plan will help you control your blood glucose and maintain a healthy lifestyle.  · Your health care provider may recommend that you work with a dietitian to make a meal plan that is best for you.  · Keep in mind that carbohydrates (carbs) and alcohol have immediate effects on your blood glucose levels. It is important to count carbs and to use alcohol carefully.  This information is not intended to replace advice given to you by your health care provider. Make sure you discuss any questions you have with your health care provider.  Document Revised: 11/24/2020 Document Reviewed: 11/24/2020  [employer] Patient Education © 2021 [employer]

## 2021-12-17 NOTE — ASSESSMENT & PLAN NOTE
Anxiety is improving on Zoloft, patient is sleeping better at night.  We will continue Zoloft 50 mg daily.

## 2021-12-17 NOTE — ASSESSMENT & PLAN NOTE
Diabetes is improving with treatment.   Dietary recommendations for ADA diet.  I will have her get a glucometer to start checking her blood sugar every day and as needed.  Requested for her to bring blood sugar log on her next appointment.  Diabetes will be reassessed in 3 months.

## 2021-12-17 NOTE — ASSESSMENT & PLAN NOTE
Hypertension is worsening.  Continue current treatment regimen.  Dietary sodium restriction.  Stop smoking.  Continue current medications.  Patient just started spironolactone which should help decrease her blood pressure.  Blood pressure will be reassessed When she comes back for her Pap smear next month.

## 2021-12-28 ENCOUNTER — OFFICE VISIT (OUTPATIENT)
Dept: FAMILY MEDICINE CLINIC | Facility: CLINIC | Age: 41
End: 2021-12-28

## 2021-12-28 VITALS
OXYGEN SATURATION: 100 % | HEIGHT: 66 IN | TEMPERATURE: 98.5 F | BODY MASS INDEX: 40.79 KG/M2 | WEIGHT: 253.8 LBS | DIASTOLIC BLOOD PRESSURE: 74 MMHG | SYSTOLIC BLOOD PRESSURE: 124 MMHG | HEART RATE: 85 BPM

## 2021-12-28 DIAGNOSIS — Z20.822 CLOSE EXPOSURE TO COVID-19 VIRUS: ICD-10-CM

## 2021-12-28 DIAGNOSIS — R05.9 COUGH: ICD-10-CM

## 2021-12-28 DIAGNOSIS — J01.00 ACUTE NON-RECURRENT MAXILLARY SINUSITIS: Primary | ICD-10-CM

## 2021-12-28 DIAGNOSIS — J02.9 SORE THROAT: ICD-10-CM

## 2021-12-28 LAB
EXPIRATION DATE: NORMAL
EXPIRATION DATE: NORMAL
FLUAV AG NPH QL: NEGATIVE
FLUBV AG NPH QL: NEGATIVE
INTERNAL CONTROL: NORMAL
INTERNAL CONTROL: NORMAL
Lab: NORMAL
Lab: NORMAL
S PYO AG THROAT QL: NEGATIVE

## 2021-12-28 PROCEDURE — 87880 STREP A ASSAY W/OPTIC: CPT | Performed by: FAMILY MEDICINE

## 2021-12-28 PROCEDURE — U0004 COV-19 TEST NON-CDC HGH THRU: HCPCS | Performed by: FAMILY MEDICINE

## 2021-12-28 PROCEDURE — 87804 INFLUENZA ASSAY W/OPTIC: CPT | Performed by: FAMILY MEDICINE

## 2021-12-28 PROCEDURE — 99213 OFFICE O/P EST LOW 20 MIN: CPT | Performed by: FAMILY MEDICINE

## 2021-12-28 RX ORDER — BROMPHENIRAMINE MALEATE, PSEUDOEPHEDRINE HYDROCHLORIDE, AND DEXTROMETHORPHAN HYDROBROMIDE 2; 30; 10 MG/5ML; MG/5ML; MG/5ML
5 SYRUP ORAL 4 TIMES DAILY PRN
Qty: 140 ML | Refills: 0 | Status: SHIPPED | OUTPATIENT
Start: 2021-12-28 | End: 2022-01-04

## 2021-12-28 RX ORDER — AMOXICILLIN 875 MG/1
875 TABLET, COATED ORAL 2 TIMES DAILY
Qty: 14 TABLET | Refills: 0 | Status: SHIPPED | OUTPATIENT
Start: 2021-12-28 | End: 2022-01-04

## 2021-12-28 NOTE — PROGRESS NOTES
"Chief Complaint  Cough, Fatigue, Sore Throat, Chills, and Muscle Pain    Subjective          Nadiya Damari Rodríguez presents to [facility]  History of Present Illness  Patient presents today for an acute visit complaining of cough, fatigue, sore throat, chills, and muscle pain. Symptoms began on 12/24/2021. She was exposed to her sister-in-law who tested positive for Covid over the weekend. The exposure occurred on 12/21/2021. She started becoming symptomatic on Candy Sara. She reports that she has been having a headache. She points to the sinus area both in the maxillary and frontal sinus area. She has been alternating between sweating and freezing. She has had some loose stools as well as well as body aches. She reports that her sister-in-law tested positive for flu a and B as well as Covid. Patient denies any fever.  Objective   Vital Signs:   /74   Pulse 85   Temp 98.5 °F (36.9 °C)   Ht 167.6 cm (66\")   Wt 115 kg (253 lb 12.8 oz)   SpO2 100%   BMI 40.96 kg/m²     Physical Exam   General: AAO ×3, congested  HEENT: Normocephalic, atraumatic, no discharge in the eyes, nasal congestion bilaterally right worse than left with maxillary sinus tenderness and fullness noted. She also has some fullness in the frontal sinuses. There is oropharyngeal erythema with postnasal drip, tonsils are not enlarged with no exudates, 1+, no cervical tenderness or lymphadenopathy  Cardiovascular: Regular rate and rhythm without appreciable murmur  Respiratory: Clear to auscultation bilaterally no RRW  Gastrointestinal: Soft nontender nondistended with bowel sounds present  extremities: No edema  Neurologic: CN II through XII grossly intact   Psychiatric: Normal mood and affect  Result Review :                 Assessment and Plan    Diagnoses and all orders for this visit:    1. Acute non-recurrent maxillary sinusitis (Primary)    2. Cough  -     POCT Influenza A/B  -     COVID-19,APTIMA " "YOLANDA(JEF), MARIAM/ KEITH, NP/OP SWAB IN UTM/VTM/[facility] [employer]SP[employer] MEDIA,24 HR TAT - Swab, Nasopharynx    3. Sore throat  -     POCT rapid strep A    4. Close exposure to COVID-19 virus    Other orders  -     amoxicillin (AMOXIL) 875 MG tablet; Take 1 tablet by mouth 2 (Two) Times a Day for 7 days.  Dispense: 14 tablet; Refill: 0  -     brompheniramine-pseudoephedrine-DM 30-2-10 MG/5ML syrup; Take 5 mL by mouth 4 (Four) Times a Day As Needed for Allergies for up to 7 days.  Dispense: 140 mL; Refill: 0    Flu and strep test are negative today.  I encouraged patient to quarantine at home and wait for the results to return of her COVID-19 test.  I will go ahead and treat her for sinusitis with amoxicillin and Bromfed.  I offered corticosteroids today including Decadron however she declines at this time citing that it causes her to be \"hateful\".  If patient has any worsening symptoms she is instructed to call return.  I did encourage her to stay well-hydrated and to take Tylenol/ibuprofen as needed for pain/fever control.    Follow Up   Return if symptoms worsen or fail to improve.  Patient was given instructions and counseling regarding her condition or for health maintenance advice. Please see specific information pulled into the AVS if appropriate.       "

## 2021-12-29 LAB — SARS-COV-2 RNA PNL SPEC NAA+PROBE: NOT DETECTED

## 2022-01-12 ENCOUNTER — OFFICE VISIT (OUTPATIENT)
Dept: FAMILY MEDICINE CLINIC | Facility: CLINIC | Age: 42
End: 2022-01-12

## 2022-01-12 VITALS
HEIGHT: 67 IN | HEART RATE: 85 BPM | BODY MASS INDEX: 39.02 KG/M2 | OXYGEN SATURATION: 99 % | SYSTOLIC BLOOD PRESSURE: 130 MMHG | DIASTOLIC BLOOD PRESSURE: 82 MMHG | TEMPERATURE: 97.1 F | WEIGHT: 248.6 LBS

## 2022-01-12 DIAGNOSIS — J45.41 MODERATE PERSISTENT ASTHMA WITH ACUTE EXACERBATION: ICD-10-CM

## 2022-01-12 DIAGNOSIS — R32 URINARY INCONTINENCE, UNSPECIFIED TYPE: Primary | ICD-10-CM

## 2022-01-12 DIAGNOSIS — F41.9 ANXIETY: ICD-10-CM

## 2022-01-12 DIAGNOSIS — G43.909 MIGRAINE WITHOUT STATUS MIGRAINOSUS, NOT INTRACTABLE, UNSPECIFIED MIGRAINE TYPE: ICD-10-CM

## 2022-01-12 DIAGNOSIS — R53.1 LEFT-SIDED WEAKNESS: ICD-10-CM

## 2022-01-12 DIAGNOSIS — B37.2 CUTANEOUS CANDIDIASIS: ICD-10-CM

## 2022-01-12 LAB
BILIRUB BLD-MCNC: ABNORMAL MG/DL
CLARITY, POC: CLEAR
COLOR UR: YELLOW
EXPIRATION DATE: ABNORMAL
GLUCOSE UR STRIP-MCNC: NEGATIVE MG/DL
KETONES UR QL: NEGATIVE
LEUKOCYTE EST, POC: NEGATIVE
Lab: ABNORMAL
NITRITE UR-MCNC: NEGATIVE MG/ML
PH UR: 5 [PH] (ref 5–8)
PROT UR STRIP-MCNC: NEGATIVE MG/DL
RBC # UR STRIP: NEGATIVE /UL
SP GR UR: 1.03 (ref 1–1.03)
UROBILINOGEN UR QL: NORMAL

## 2022-01-12 PROCEDURE — 81003 URINALYSIS AUTO W/O SCOPE: CPT | Performed by: NURSE PRACTITIONER

## 2022-01-12 PROCEDURE — 99215 OFFICE O/P EST HI 40 MIN: CPT | Performed by: NURSE PRACTITIONER

## 2022-01-12 RX ORDER — NYSTATIN 100000 [USP'U]/G
POWDER TOPICAL 3 TIMES DAILY
Qty: 45 G | Refills: 0 | Status: SHIPPED | OUTPATIENT
Start: 2022-01-12 | End: 2022-08-30

## 2022-01-12 NOTE — ASSESSMENT & PLAN NOTE
Anxiety is controlled on Zoloft but patient is having other symptoms and is unsure if she may actually have bipolar.  I will refer her to psychiatry for further evaluation and treatment.

## 2022-01-12 NOTE — ASSESSMENT & PLAN NOTE
Urinary incontinence is newly identified.  Patient does not want to stop diuretic due to it helping with her swelling.  She would like to pursue pelvic floor physical therapy.

## 2022-01-12 NOTE — ASSESSMENT & PLAN NOTE
Asthma is worsening, patient is continuing to have wheezing.  Patient declines for oral steroids.  We will start her on Advair inhaler routinely.  I did advise her that the Advair inhaler is maintenance and that she should continue to use albuterol inhaler when she is having difficulty breathing, shortness of breath, cough or wheezing.

## 2022-01-12 NOTE — ASSESSMENT & PLAN NOTE
Due to her left-sided weakness and frequent falls, I am going to refer her back to neurology for further work-up and treatment.

## 2022-01-12 NOTE — PROGRESS NOTES
"Chief Complaint  Rash and Urinary Frequency (incontinence)    Subjective          Nadiya Damari Rodríguez presents to [facility]  History of Present Illness   41-year-old female presents today for an acute visit complaining of urinary incontinence.  She was recently started on spironolactone about a month ago for her swelling which she states has helped.  She has started having urge incontinence since starting the diuretic but now she states she is having a constant leaking of urine.  She does not want to stop the spironolactone because it is helping with her swelling.  She states she has tried Kegel exercises but does not seem to help.  Urinalysis was obtained in office today and was negative for UTI.    She is also complaining of a rash in her groin area that is itchy.    She is been taking Zoloft for her anxiety and is now on 50 mg.  She states that she thinks that it \"makes her bipolar worse during her menstrual cycle.\"  She states that years ago at [facility] she was diagnosed with bipolar but she had a lot of things going on at that time.  She does not know if she is truly bipolar or not.  She states that stress tends to make her menstrual cycles cramp harder.    She states that she has a history of migraines that have gotten worse over the last couple years.  She used to follow with neurology and last saw Barbara Griffin.  She also complains that she falls randomly unprovoked.  She states she has a long history of having left-sided weakness especially in her face.  She had an MRI of the brain in 2020 that was unremarkable.  Previous CT of the head was unremarkable also.    She was seen recently on 12/28/2021 by Dr. Gan due to a sinus infection and cough.  She also has a history of asthma and has been wheezing lately.  She does not want to take steroids because she states they make her \"mean.\"  Objective   Vital Signs:   /82   Pulse 85   Temp 97.1 °F (36.2 °C)  " " Ht 170.2 cm (67\")   Wt 113 kg (248 lb 9.6 oz)   SpO2 99%   BMI 38.94 kg/m²     Physical Exam  Vitals reviewed.   Constitutional:       Appearance: Normal appearance. She is well-developed.   Neck:      Thyroid: No thyroid mass, thyromegaly or thyroid tenderness.   Cardiovascular:      Rate and Rhythm: Normal rate and regular rhythm.      Heart sounds: No murmur heard.  No friction rub. No gallop.    Pulmonary:      Effort: Pulmonary effort is normal.      Breath sounds: Examination of the right-lower field reveals wheezing. Wheezing present. No rhonchi.   Lymphadenopathy:      Cervical: No cervical adenopathy.   Skin:     General: Skin is warm and dry.   Neurological:      Mental Status: She is alert and oriented to person, place, and time.      Cranial Nerves: No cranial nerve deficit.   Psychiatric:         Mood and Affect: Mood and affect normal.         Behavior: Behavior normal.         Thought Content: Thought content normal. Thought content does not include homicidal or suicidal ideation.         Judgment: Judgment normal.        Result Review :       Data reviewed: Radiologic studies MRI and CT of head from 2020 was reviewed.          Assessment and Plan    Diagnoses and all orders for this visit:    1. Urinary incontinence, unspecified type (Primary)  Assessment & Plan:  Urinary incontinence is newly identified.  Patient does not want to stop diuretic due to it helping with her swelling.  She would like to pursue pelvic floor physical therapy.    Orders:  -     POCT urinalysis dipstick, automated  -     Ambulatory Referral to Physical Therapy Pelvic Floor    2. Anxiety  Assessment & Plan:  Anxiety is controlled on Zoloft but patient is having other symptoms and is unsure if she may actually have bipolar.  I will refer her to psychiatry for further evaluation and treatment.    Orders:  -     Ambulatory Referral to Psychiatry    3. Cutaneous candidiasis  Assessment & Plan:  Cutaneous yeast infection is " newly identified, will treat her with nystatin powder.      4. Left-sided weakness  Assessment & Plan:  Due to her left-sided weakness and frequent falls, I am going to refer her back to neurology for further work-up and treatment.    Orders:  -     Ambulatory Referral to Neurology    5. Migraine without status migrainosus, not intractable, unspecified migraine type  Assessment & Plan:  Headaches are worsening.  Continue current treatment regimen.  I will refer her back to neurology for further work-up and treatment.        Orders:  -     Ambulatory Referral to Neurology    6. Moderate persistent asthma with acute exacerbation  Assessment & Plan:  Asthma is worsening, patient is continuing to have wheezing.  Patient declines for oral steroids.  We will start her on Advair inhaler routinely.  I did advise her that the Advair inhaler is maintenance and that she should continue to use albuterol inhaler when she is having difficulty breathing, shortness of breath, cough or wheezing.      Other orders  -     nystatin (MYCOSTATIN) 271708 UNIT/GM powder; Apply  topically to the appropriate area as directed 3 (Three) Times a Day.  Dispense: 45 g; Refill: 0  -     fluticasone-salmeterol (Advair Diskus) 250-50 MCG/DOSE DISKUS; Inhale 1 puff 2 (Two) Times a Day.  Dispense: 60 each; Refill: 5    I spent 45 minutes caring for Nadiya on this date of service. This time includes time spent by me in the following activities:preparing for the visit, reviewing tests, performing a medically appropriate examination and/or evaluation , counseling and educating the patient/family/caregiver, ordering medications, tests, or procedures, documenting information in the medical record and care coordination  Follow Up   Return for Patient will keep her follow-up appointment on 1/21/2022..  Patient was given instructions and counseling regarding her condition or for health maintenance advice. Please see specific information pulled into the AVS  if appropriate.

## 2022-01-12 NOTE — ASSESSMENT & PLAN NOTE
Headaches are worsening.  Continue current treatment regimen.  I will refer her back to neurology for further work-up and treatment.

## 2022-01-14 ENCOUNTER — TELEPHONE (OUTPATIENT)
Dept: FAMILY MEDICINE CLINIC | Facility: CLINIC | Age: 42
End: 2022-01-14

## 2022-01-14 RX ORDER — TRIAMCINOLONE ACETONIDE 1 MG/G
1 CREAM TOPICAL 3 TIMES DAILY
Qty: 30 G | Refills: 0 | Status: SHIPPED | OUTPATIENT
Start: 2022-01-14 | End: 2022-08-30

## 2022-01-14 RX ORDER — ONDANSETRON 4 MG/1
4 TABLET, ORALLY DISINTEGRATING ORAL EVERY 6 HOURS PRN
Qty: 20 TABLET | Refills: 0 | Status: SHIPPED | OUTPATIENT
Start: 2022-01-14 | End: 2022-01-20

## 2022-01-17 ENCOUNTER — HOSPITAL ENCOUNTER (EMERGENCY)
Facility: HOSPITAL | Age: 42
Discharge: HOME | End: 2022-01-17
Attending: EMERGENCY MEDICINE | Admitting: EMERGENCY MEDICINE

## 2022-01-17 VITALS
TEMPERATURE: 98.3 F | BODY MASS INDEX: 39.17 KG/M2 | SYSTOLIC BLOOD PRESSURE: 115 MMHG | HEART RATE: 63 BPM | WEIGHT: 249.56 LBS | RESPIRATION RATE: 17 BRPM | HEIGHT: 67 IN | OXYGEN SATURATION: 97 % | DIASTOLIC BLOOD PRESSURE: 73 MMHG

## 2022-01-17 DIAGNOSIS — L03.314 CELLULITIS OF GROIN: ICD-10-CM

## 2022-01-17 DIAGNOSIS — R11.2 NAUSEA AND VOMITING, INTRACTABILITY OF VOMITING NOT SPECIFIED, UNSPECIFIED VOMITING TYPE: ICD-10-CM

## 2022-01-17 DIAGNOSIS — B35.6 TINEA CRURIS: Primary | ICD-10-CM

## 2022-01-17 LAB
ALBUMIN SERPL-MCNC: 4.7 G/DL (ref 3.5–5.2)
ALBUMIN/GLOB SERPL: 1.7 G/DL
ALP SERPL-CCNC: 68 U/L (ref 39–117)
ALT SERPL W P-5'-P-CCNC: 27 U/L (ref 1–33)
ANION GAP SERPL CALCULATED.3IONS-SCNC: 12.7 MMOL/L (ref 5–15)
AST SERPL-CCNC: 21 U/L (ref 1–32)
BASOPHILS # BLD AUTO: 0.09 10*3/MM3 (ref 0–0.2)
BASOPHILS NFR BLD AUTO: 0.8 % (ref 0–1.5)
BILIRUB SERPL-MCNC: 0.2 MG/DL (ref 0–1.2)
BUN SERPL-MCNC: 10 MG/DL (ref 6–20)
BUN/CREAT SERPL: 10.8 (ref 7–25)
CALCIUM SPEC-SCNC: 9.7 MG/DL (ref 8.6–10.5)
CHLORIDE SERPL-SCNC: 101 MMOL/L (ref 98–107)
CO2 SERPL-SCNC: 24.3 MMOL/L (ref 22–29)
CREAT SERPL-MCNC: 0.93 MG/DL (ref 0.57–1)
DEPRECATED RDW RBC AUTO: 46.6 FL (ref 37–54)
EOSINOPHIL # BLD AUTO: 0.23 10*3/MM3 (ref 0–0.4)
EOSINOPHIL NFR BLD AUTO: 2 % (ref 0.3–6.2)
ERYTHROCYTE [DISTWIDTH] IN BLOOD BY AUTOMATED COUNT: 13.5 % (ref 12.3–15.4)
GFR SERPL CREATININE-BSD FRML MDRD: 66 ML/MIN/1.73
GLOBULIN UR ELPH-MCNC: 2.7 GM/DL
GLUCOSE SERPL-MCNC: 102 MG/DL (ref 65–99)
HCT VFR BLD AUTO: 45.6 % (ref 34–46.6)
HGB BLD-MCNC: 15.3 G/DL (ref 12–15.9)
HOLD SPECIMEN: NORMAL
IMM GRANULOCYTES # BLD AUTO: 0.05 10*3/MM3 (ref 0–0.05)
IMM GRANULOCYTES NFR BLD AUTO: 0.4 % (ref 0–0.5)
LYMPHOCYTES # BLD AUTO: 3.63 10*3/MM3 (ref 0.7–3.1)
LYMPHOCYTES NFR BLD AUTO: 31.6 % (ref 19.6–45.3)
MCH RBC QN AUTO: 31.2 PG (ref 26.6–33)
MCHC RBC AUTO-ENTMCNC: 33.6 G/DL (ref 31.5–35.7)
MCV RBC AUTO: 92.9 FL (ref 79–97)
MONOCYTES # BLD AUTO: 0.7 10*3/MM3 (ref 0.1–0.9)
MONOCYTES NFR BLD AUTO: 6.1 % (ref 5–12)
NEUTROPHILS NFR BLD AUTO: 59.1 % (ref 42.7–76)
NEUTROPHILS NFR BLD AUTO: 6.8 10*3/MM3 (ref 1.7–7)
NRBC BLD AUTO-RTO: 0 /100 WBC (ref 0–0.2)
PLATELET # BLD AUTO: 321 10*3/MM3 (ref 140–450)
PMV BLD AUTO: 10.4 FL (ref 6–12)
POTASSIUM SERPL-SCNC: 4.1 MMOL/L (ref 3.5–5.2)
PROT SERPL-MCNC: 7.4 G/DL (ref 6–8.5)
RBC # BLD AUTO: 4.91 10*6/MM3 (ref 3.77–5.28)
SODIUM SERPL-SCNC: 138 MMOL/L (ref 136–145)
WBC NRBC COR # BLD: 11.5 10*3/MM3 (ref 3.4–10.8)
WHOLE BLOOD HOLD SPECIMEN: NORMAL
WHOLE BLOOD HOLD SPECIMEN: NORMAL

## 2022-01-17 PROCEDURE — 25010000002 ONDANSETRON PER 1 MG: Performed by: EMERGENCY MEDICINE

## 2022-01-17 PROCEDURE — 36415 COLL VENOUS BLD VENIPUNCTURE: CPT

## 2022-01-17 PROCEDURE — 99283 EMERGENCY DEPT VISIT LOW MDM: CPT

## 2022-01-17 PROCEDURE — 85025 COMPLETE CBC W/AUTO DIFF WBC: CPT

## 2022-01-17 PROCEDURE — 96374 THER/PROPH/DIAG INJ IV PUSH: CPT

## 2022-01-17 PROCEDURE — 80053 COMPREHEN METABOLIC PANEL: CPT

## 2022-01-17 PROCEDURE — 87040 BLOOD CULTURE FOR BACTERIA: CPT | Performed by: EMERGENCY MEDICINE

## 2022-01-17 RX ORDER — DOXYCYCLINE 100 MG/1
100 CAPSULE ORAL 2 TIMES DAILY
Qty: 20 CAPSULE | Refills: 0 | Status: SHIPPED | OUTPATIENT
Start: 2022-01-17 | End: 2022-04-05

## 2022-01-17 RX ORDER — DOXYCYCLINE 100 MG/1
100 CAPSULE ORAL ONCE
Status: COMPLETED | OUTPATIENT
Start: 2022-01-17 | End: 2022-01-17

## 2022-01-17 RX ORDER — ONDANSETRON 4 MG/1
4 TABLET, ORALLY DISINTEGRATING ORAL EVERY 6 HOURS PRN
Qty: 15 TABLET | Refills: 0 | Status: SHIPPED | OUTPATIENT
Start: 2022-01-17 | End: 2022-08-30

## 2022-01-17 RX ORDER — ONDANSETRON 2 MG/ML
4 INJECTION INTRAMUSCULAR; INTRAVENOUS ONCE
Status: COMPLETED | OUTPATIENT
Start: 2022-01-17 | End: 2022-01-17

## 2022-01-17 RX ADMIN — SODIUM CHLORIDE 1000 ML: 9 INJECTION, SOLUTION INTRAVENOUS at 22:22

## 2022-01-17 RX ADMIN — ONDANSETRON 4 MG: 2 INJECTION INTRAMUSCULAR; INTRAVENOUS at 22:22

## 2022-01-17 RX ADMIN — DOXYCYCLINE 100 MG: 100 CAPSULE ORAL at 23:17

## 2022-01-18 NOTE — ED PROVIDER NOTES
Time: 9:13 PM EST  Arrived by: private car  Chief Complaint: Skin infection  History provided by: Patient  History is limited by: N/A     History of Present Illness:  Patient is a 41 y.o. year old female that presents to the emergency department with complaint of skin infection in the groin area.  Patient is currently being treated for fungal infection in the same area.    HPI    Similar Symptoms Previously: Yes  Recently seen: Recently seen by PCP for same complaint.      Patient Care Team  Primary Care Provider: Leticia Aguiar APRN    Past Medical History:     Allergies   Allergen Reactions   • Bactrim [Sulfamethoxazole-Trimethoprim] Anaphylaxis   • Hydrochlorothiazide Unknown - Low Severity   • Ketorolac Tromethamine Unknown - Low Severity   • Latex Itching     Past Medical History:   Diagnosis Date   • Allergic    • Anemia    • Anxiety    • Asthma    • Cellulitis    • Heart murmur    • Hypertension    • Umbilical hernia      Past Surgical History:   Procedure Laterality Date   • KNEE SURGERY Bilateral    • SHOULDER SURGERY Right    • TUBAL ABDOMINAL LIGATION       History reviewed. No pertinent family history.    Home Medications:  Prior to Admission medications    Medication Sig Start Date End Date Taking? Authorizing Provider   albuterol sulfate  (90 Base) MCG/ACT inhaler Inhale 2 puffs Every 4 (Four) Hours As Needed.    Provider, MD Annabel   aspirin 81 MG chewable tablet Chew 81 mg Daily.    Provider, MD Annabel   cyanocobalamin 1000 MCG/ML injection Inject 1 mL into the appropriate muscle as directed by prescriber Every 28 (Twenty-Eight) Days. 11/15/21   Leticia Aguiar APRN   fluticasone-salmeterol (Advair Diskus) 250-50 MCG/DOSE DISKUS Inhale 1 puff 2 (Two) Times a Day. 1/12/22   Leticia Aguiar APRN   metoprolol tartrate (LOPRESSOR) 100 MG tablet Take 1 tablet by mouth Daily. 12/10/21   Leticia Aguiar APRN   nystatin (MYCOSTATIN) 716730 UNIT/GM powder Apply   "topically to the appropriate area as directed 3 (Three) Times a Day. 1/12/22   Leticia Aguiar APRN   ondansetron ODT (Zofran ODT) 4 MG disintegrating tablet Place 1 tablet on the tongue Every 6 (Six) Hours As Needed for Nausea or Vomiting. 1/14/22   Leticia Aguiar APRN   sertraline (Zoloft) 50 MG tablet Take 1 tablet by mouth Daily. 11/15/21   Leticia Aguiar APRN   spironolactone (Aldactone) 25 MG tablet Take 1 tablet by mouth Daily. 11/15/21   Leticia Aguiar APRN   Syringe 27G X 1-1/4\" 3 ML misc 1 each Every 28 (Twenty-Eight) Days. For B12 injection 11/15/21   Leticia Aguiar APRN   triamcinolone (KENALOG) 0.1 % cream Apply 1 application topically to the appropriate area as directed 3 (Three) Times a Day. 1/14/22   Leticia Aguiar APRN        Social History:   Social History     Tobacco Use   • Smoking status: Current Every Day Smoker     Packs/day: 0.50     Years: 25.00     Pack years: 12.50   • Smokeless tobacco: Never Used   Vaping Use   • Vaping Use: Never used   Substance Use Topics   • Alcohol use: Not Currently   • Drug use: Yes     Types: Marijuana       Review of Systems:  Review of Systems   Constitutional: Negative.    HENT: Negative.    Eyes: Negative.    Respiratory: Negative.    Cardiovascular: Negative.    Gastrointestinal: Positive for nausea and vomiting.   Endocrine: Negative.    Genitourinary: Negative.    Musculoskeletal: Negative.    Skin: Positive for rash.   Allergic/Immunologic: Negative.    Neurological: Negative.    Hematological: Negative.    Psychiatric/Behavioral: Negative.         Physical Exam:  /73   Pulse 63   Temp 98.3 °F (36.8 °C) (Oral)   Resp 17   Ht 170.2 cm (67\")   Wt 113 kg (249 lb 9 oz)   SpO2 97%   BMI 39.09 kg/m²     Physical Exam  Vitals and nursing note reviewed.   Constitutional:       Appearance: Normal appearance.   HENT:      Head: Normocephalic.   Cardiovascular:      Rate and Rhythm: Normal rate and regular " rhythm.      Heart sounds: Normal heart sounds.   Pulmonary:      Effort: Pulmonary effort is normal.      Breath sounds: Normal breath sounds.   Abdominal:      General: Bowel sounds are normal.      Palpations: Abdomen is soft.   Genitourinary:     Comments: Patient has fungal appearing rash in the groin and perianal area.  There are some small areas that appear to be consistent with early cellulitis.  Musculoskeletal:         General: Normal range of motion.      Cervical back: Normal range of motion and neck supple.   Skin:     General: Skin is warm and dry.   Neurological:      General: No focal deficit present.      Mental Status: She is alert and oriented to person, place, and time.   Psychiatric:         Mood and Affect: Mood normal.                Medications in the Emergency Department:  Medications   sodium chloride 0.9 % bolus 1,000 mL (0 mL Intravenous Stopped 1/17/22 2321)   ondansetron (ZOFRAN) injection 4 mg (4 mg Intravenous Given 1/17/22 2222)   doxycycline (MONODOX) capsule 100 mg (100 mg Oral Given 1/17/22 2317)        Labs  Lab Results (last 24 hours)     ** No results found for the last 24 hours. **           Imaging:  No Radiology Exams Resulted Within Past 24 Hours    Procedures:  Procedures    Progress                            Medical Decision Making:  MDM   patient complained of nausea and stated she had vomited previous to coming to the emergency department.  Patient was given IV fluids and Zofran.  Patient is stable for discharge we will continue treating the fungal infection and I will add antibiotics to her regiment and a prescription for nausea medication.      Final diagnoses:   Tinea cruris   Nausea and vomiting, intractability of vomiting not specified, unspecified vomiting type   Cellulitis of groin        Disposition:  ED Disposition     ED Disposition Condition Comment    Discharge Stable           Documentation assistance provided by Christopher Schwartz DO acting as scribe for Christopher  DO Efren. Information recorded by the scribe was done at my direction and has been verified and validated by me.        Christopher Schwartz DO  01/20/22 0008

## 2022-01-19 NOTE — PROGRESS NOTES
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 Therapy: Will refer patient for psychotherapy.  3. Risk Assessment: Risk of self-harm acutely is moderate to high.  Risk factors include anxiety disorder, mood disorder, family history of daughter with suicide attempt, access to guns, h/o suicide attempt and self harm in the past, AODA, and recent psychosocial stressors (pandemic). Protective factors include no present SI, healthcare seeking, future orientation, willingness to engage in care.  Risk of self-harm chronically is also moderate to high, but could be further elevated in the event of treatment noncompliance and/or AODA.  4. Labs/Diagnostics Ordered:   Orders Placed This Encounter   Procedures   • Ambulatory Referral to Psychotherapy     5. Medications:   New Medications Ordered This Visit   Medications   • lamoTRIgine (LaMICtal) 25 MG tablet     Sig: Take 1 tab PO QHS x 2 weeks, if tolerated take 2 tab PO QHS     Dispense:  60 tablet     Refill:  1   • QUEtiapine (SEROquel) 50 MG tablet     Sig: Take 0.5 tab PO QHS for sleep. Can take 0.5 tab PO one hour later if needed     Dispense:  30 tablet     Refill:  1       Discussed all risks, benefits, alternatives, and side effects of Lamictal, including but not limited to rash, rebound depressive or manic symptoms if prompt discontinuation, GI upset, agitation, and  sedation. Pt instructed to avoid driving and doing other tasks or actions that require to be alert until knowing how the drug affects them.  Pt informed that birth control pills and other hormone-based birth control may not work as well to prevent pregnancy and advised to use some other kind of birth control, such as condoms, while taking this med. Discussed the need for pt to immediately call the office for any new or worsening symptoms, such as rash, worsening depression; feeling nervous or restless; suicidal thoughts or actions; or other changes changes in mood or behavior, and all other concerns. Pt educated on med compliance and the risks of suddenly stopping this medication or missing doses. Pt verbalized understanding and is agreeable to taking Lamictal. Addressed all questions and concerns.     Discussed all risks, benefits, alternatives, and side effects of Quetiapine, including but not limited to GI upset, agitation, dizziness, headache, sexual dysfunction, sedation, weight gain, anticholinergic effects, cataract risk, dyslipidemia, extrapyramidal symptoms (dystonia, drug-induced parkinsonism, akathisia, tardive dyskinesia), lowering of seizure threshold, hematologic abnormalities, hyperglycemia, hypothyroidism, increased mortality in elderly patients with dementia-related psychosis, neuroleptic malignant syndrome, orthostatic hypotension, falls risk in older adults, prolonged QT interval, and temperature dysregulation. Pt instructed to avoid driving and doing other tasks or actions that require to be alert until knowing how the drug affects them.  Pt educated on the need to practice safe sex while taking this med. Discussed the need for pt to immediately call the office for any new or worsening symptoms, such as worsening depression; feeling nervous or restless; suicidal thoughts or actions; or other changes changes in mood or behavior, and all other concerns. Pt educated on med compliance and the risks  of suddenly stopping this medication or missing doses. Pt verbalized understanding and is agreeable to taking Quetiapine. Addressed all questions and concerns.       6. Follow up:   F/u in 4 weeks.     TREATMENT PLAN/GOALS: Continue supportive psychotherapy efforts and medications as indicated. Treatment and medication options discussed during today's visit. Patient ackowledged and verbally consented to continue with current treatment plan and was educated on the importance of compliance with treatment and follow-up appointments.    MEDICATION ISSUES:  CAMMY reviewed as expected.  Discussed medication options and treatment plan of prescribed medication as well as the risks, benefits, and side effects including potential falls, possible impaired driving and metabolic adversities among others. Patient is agreeable to call the office with any worsening of symptoms or onset of side effects. Patient is agreeable to call 911 or go to the nearest ER should he/she begin having SI/HI. No medication side effects or related complaints today.            This document has been electronically signed by Zulma Guerrero PA-C  January 21, 2022 10:25 EST      Part of this note may be an electronic transcription/translation of spoken language to printed text using the Dragon Dictation System.

## 2022-01-20 ENCOUNTER — OFFICE VISIT (OUTPATIENT)
Dept: BEHAVIORAL HEALTH | Facility: CLINIC | Age: 42
End: 2022-01-20

## 2022-01-20 VITALS
HEIGHT: 66 IN | BODY MASS INDEX: 40.02 KG/M2 | DIASTOLIC BLOOD PRESSURE: 80 MMHG | WEIGHT: 249 LBS | SYSTOLIC BLOOD PRESSURE: 130 MMHG

## 2022-01-20 DIAGNOSIS — F43.10 POST TRAUMATIC STRESS DISORDER (PTSD): ICD-10-CM

## 2022-01-20 DIAGNOSIS — F31.30 BIPOLAR AFFECTIVE DISORDER, CURRENT EPISODE DEPRESSED, CURRENT EPISODE SEVERITY UNSPECIFIED: Primary | ICD-10-CM

## 2022-01-20 DIAGNOSIS — F41.9 ANXIETY: ICD-10-CM

## 2022-01-20 DIAGNOSIS — G47.00 INSOMNIA, UNSPECIFIED TYPE: ICD-10-CM

## 2022-01-20 PROCEDURE — 90792 PSYCH DIAG EVAL W/MED SRVCS: CPT | Performed by: PHYSICIAN ASSISTANT

## 2022-01-20 RX ORDER — LANCETS
EACH MISCELLANEOUS
COMMUNITY
Start: 2022-01-10

## 2022-01-20 RX ORDER — BLOOD SUGAR DIAGNOSTIC
STRIP MISCELLANEOUS
COMMUNITY
Start: 2022-01-10

## 2022-01-20 RX ORDER — BLOOD-GLUCOSE METER
EACH MISCELLANEOUS
COMMUNITY
Start: 2022-01-06

## 2022-01-20 RX ORDER — LAMOTRIGINE 25 MG/1
TABLET ORAL
Qty: 60 TABLET | Refills: 1 | Status: SHIPPED | OUTPATIENT
Start: 2022-01-20 | End: 2022-03-29 | Stop reason: SDUPTHER

## 2022-01-20 RX ORDER — QUETIAPINE FUMARATE 50 MG/1
TABLET, FILM COATED ORAL
Qty: 30 TABLET | Refills: 1 | Status: SHIPPED | OUTPATIENT
Start: 2022-01-20 | End: 2022-03-29

## 2022-01-21 ENCOUNTER — OFFICE VISIT (OUTPATIENT)
Dept: FAMILY MEDICINE CLINIC | Facility: CLINIC | Age: 42
End: 2022-01-21

## 2022-01-21 VITALS
TEMPERATURE: 97.6 F | HEART RATE: 76 BPM | SYSTOLIC BLOOD PRESSURE: 122 MMHG | DIASTOLIC BLOOD PRESSURE: 62 MMHG | BODY MASS INDEX: 38.77 KG/M2 | HEIGHT: 67 IN | WEIGHT: 247 LBS | OXYGEN SATURATION: 99 %

## 2022-01-21 DIAGNOSIS — A53.9 SYPHILIS: ICD-10-CM

## 2022-01-21 DIAGNOSIS — B37.31 CANDIDIASIS OF VULVA AND VAGINA: ICD-10-CM

## 2022-01-21 DIAGNOSIS — Z20.2 POSSIBLE EXPOSURE TO STD: Primary | ICD-10-CM

## 2022-01-21 DIAGNOSIS — I10 PRIMARY HYPERTENSION: ICD-10-CM

## 2022-01-21 LAB
CANDIDA SPECIES: NEGATIVE
GARDNERELLA VAGINALIS: POSITIVE
HBV SURFACE AG SERPL QL IA: NORMAL
T VAGINALIS DNA VAG QL PROBE+SIG AMP: NEGATIVE

## 2022-01-21 PROCEDURE — 86706 HEP B SURFACE ANTIBODY: CPT | Performed by: NURSE PRACTITIONER

## 2022-01-21 PROCEDURE — 87491 CHLMYD TRACH DNA AMP PROBE: CPT | Performed by: NURSE PRACTITIONER

## 2022-01-21 PROCEDURE — 86780 TREPONEMA PALLIDUM: CPT | Performed by: NURSE PRACTITIONER

## 2022-01-21 PROCEDURE — 87591 N.GONORRHOEAE DNA AMP PROB: CPT | Performed by: NURSE PRACTITIONER

## 2022-01-21 PROCEDURE — 36415 COLL VENOUS BLD VENIPUNCTURE: CPT | Performed by: NURSE PRACTITIONER

## 2022-01-21 PROCEDURE — 87510 GARDNER VAG DNA DIR PROBE: CPT | Performed by: NURSE PRACTITIONER

## 2022-01-21 PROCEDURE — 87660 TRICHOMONAS VAGIN DIR PROBE: CPT | Performed by: NURSE PRACTITIONER

## 2022-01-21 PROCEDURE — 86803 HEPATITIS C AB TEST: CPT | Performed by: NURSE PRACTITIONER

## 2022-01-21 PROCEDURE — 86592 SYPHILIS TEST NON-TREP QUAL: CPT | Performed by: NURSE PRACTITIONER

## 2022-01-21 PROCEDURE — 87480 CANDIDA DNA DIR PROBE: CPT | Performed by: NURSE PRACTITIONER

## 2022-01-21 PROCEDURE — 86708 HEPATITIS A ANTIBODY: CPT | Performed by: NURSE PRACTITIONER

## 2022-01-21 PROCEDURE — 99214 OFFICE O/P EST MOD 30 MIN: CPT | Performed by: NURSE PRACTITIONER

## 2022-01-21 PROCEDURE — 86593 SYPHILIS TEST NON-TREP QUANT: CPT | Performed by: NURSE PRACTITIONER

## 2022-01-21 PROCEDURE — 86704 HEP B CORE ANTIBODY TOTAL: CPT | Performed by: NURSE PRACTITIONER

## 2022-01-21 PROCEDURE — G0432 EIA HIV-1/HIV-2 SCREEN: HCPCS | Performed by: NURSE PRACTITIONER

## 2022-01-21 PROCEDURE — 87340 HEPATITIS B SURFACE AG IA: CPT | Performed by: NURSE PRACTITIONER

## 2022-01-21 RX ORDER — FLUCONAZOLE 100 MG/1
TABLET ORAL
Qty: 11 TABLET | Refills: 0 | Status: SHIPPED | OUTPATIENT
Start: 2022-01-21 | End: 2022-04-05

## 2022-01-21 NOTE — PROGRESS NOTES
"Chief Complaint  Hypertension    Subjective          Nadiya Damari Rodríguez presents to [facility]  History of Present Illness   41-year-old female presents today for 1 month follow-up on hypertension.  Her blood pressure has improved 122/62.  She is currently on metoprolol 100 mg daily.    She is still complaining of a rash in her groin area that seems to be worsening and states she feels like it is on the inside of vaginal area as well.  She was first treated with some nystatin powder but states that seem to make it worse.  She was given triamcinolone cream which seemed to help some.  She also states her  has a rash in the same area.    She has some concerns for exposure to HIV.  She states that her mother-in-law has had HIV for years and now has AIDS.  The concern is that she does not know how long she has had HIV but it has been many years and possibly prior to her mother-in-law giving birth to her .  Her  has never been checked for HIV.  Patient would like to be checked for HIV.  She states that she personally has a history of genital warts and pelvic inflammatory disease.  She states that she would like to have STD testing today.  Objective   Vital Signs:   /62   Pulse 76   Temp 97.6 °F (36.4 °C)   Ht 170.2 cm (67\")   Wt 112 kg (247 lb)   SpO2 99%   BMI 38.69 kg/m²     Physical Exam  Vitals reviewed. Exam conducted with a chaperone present.   Constitutional:       Appearance: Normal appearance. She is well-developed.   Neck:      Thyroid: No thyroid mass, thyromegaly or thyroid tenderness.   Cardiovascular:      Rate and Rhythm: Normal rate and regular rhythm.      Heart sounds: No murmur heard.  No friction rub. No gallop.    Pulmonary:      Effort: Pulmonary effort is normal.      Breath sounds: Normal breath sounds. No wheezing or rhonchi.   Genitourinary:     Pubic Area: Rash present.      Vagina: Normal. No vaginal discharge or tenderness. "   Lymphadenopathy:      Cervical: No cervical adenopathy.   Skin:     General: Skin is warm and dry.   Neurological:      Mental Status: She is alert and oriented to person, place, and time.      Cranial Nerves: No cranial nerve deficit.   Psychiatric:         Mood and Affect: Mood and affect normal.         Behavior: Behavior normal.         Thought Content: Thought content normal. Thought content does not include homicidal or suicidal ideation.         Judgment: Judgment normal.        Result Review :                 Assessment and Plan    Diagnoses and all orders for this visit:    1. Possible exposure to STD (Primary)  Comments:  We will check her for STDs today.  Orders:  -     HIV-1/O/2 Ag/Ab w Reflex  -     Hepatitis B surface antigen  -     Hepatitis B surface antibody  -     Hepatitis B core antibody, total  -     Hepatitis A antibody, total  -     Hepatitis C antibody  -     RPR  -     Neisserria Gonorrhea and Chlamydia by ThinPrep - ThinPrep Vial, Cervix  -     Gardnerella vaginalis, Trichomonas vaginalis, Candida albicans, DNA - Swab, Vagina    2. Candidiasis of vulva and vagina  Comments:  I will treat her with Diflucan.  Advised she can also still use the triamcinolone cream if it is helping.    3. Primary hypertension  Assessment & Plan:  Hypertension is improving with treatment.  Continue current treatment regimen.  Continue current medications.  Blood pressure will be reassessed at the next regular appointment.      Other orders  -     fluconazole (Diflucan) 100 MG tablet; Take 2 tabs day one then one tab daily until finished  Dispense: 11 tablet; Refill: 0      Follow Up   Return for pap.  Patient was given instructions and counseling regarding her condition or for health maintenance advice. Please see specific information pulled into the AVS if appropriate.

## 2022-01-22 LAB
BACTERIA SPEC AEROBE CULT: NORMAL
BACTERIA SPEC AEROBE CULT: NORMAL
C TRACH RRNA CVX QL NAA+PROBE: NOT DETECTED
HBV SURFACE AB SER RIA-ACNC: NORMAL
HCV AB SER DONR QL: NORMAL
HIV1+2 AB SER QL: NORMAL
N GONORRHOEA RRNA SPEC QL NAA+PROBE: NOT DETECTED
RPR SER QL: ABNORMAL
RPR SER-TITR: ABNORMAL {TITER}

## 2022-01-23 LAB
HAV AB SER QL IA: NEGATIVE
HBV CORE AB SERPL QL IA: NEGATIVE

## 2022-01-24 ENCOUNTER — CLINICAL SUPPORT (OUTPATIENT)
Dept: FAMILY MEDICINE CLINIC | Facility: CLINIC | Age: 42
End: 2022-01-24

## 2022-01-24 ENCOUNTER — TELEPHONE (OUTPATIENT)
Dept: FAMILY MEDICINE CLINIC | Facility: CLINIC | Age: 42
End: 2022-01-24

## 2022-01-24 DIAGNOSIS — A53.9 SYPHILIS: ICD-10-CM

## 2022-01-24 PROCEDURE — 96372 THER/PROPH/DIAG INJ SC/IM: CPT | Performed by: NURSE PRACTITIONER

## 2022-01-24 RX ORDER — METRONIDAZOLE 500 MG/1
500 TABLET ORAL 2 TIMES DAILY
Qty: 14 TABLET | Refills: 0 | Status: SHIPPED | OUTPATIENT
Start: 2022-01-24 | End: 2022-01-31

## 2022-01-24 NOTE — TELEPHONE ENCOUNTER
Caller: Nadiya Rodríguez    Relationship:     Best call back number: [phone]    What is the best time to reach you: ANYTIME    Who are you requesting to speak with (clinical staff, provider,  specific staff member): CLINICAL    Do you know the name of the person who called:  What was the call regarding:         PATIENT IS RETURNING CALL FROM PCP OFFICE  REGARDING TEST RESULTS. PATIENT WOULD LIKE A CALL BACK ASAP      Do you require a callback: YES

## 2022-01-25 ENCOUNTER — TELEPHONE (OUTPATIENT)
Dept: FAMILY MEDICINE CLINIC | Facility: CLINIC | Age: 42
End: 2022-01-25

## 2022-01-25 NOTE — TELEPHONE ENCOUNTER
I don't believe these symptoms are from the PCN injection. If the symptoms persist or worsen, she needs to go to the ER. (I tried to call her but got her VM).

## 2022-01-25 NOTE — TELEPHONE ENCOUNTER
Left voicemail for patient that Leticia Aguiar tried calling her and to go to the ER if symptoms worsen.

## 2022-01-25 NOTE — TELEPHONE ENCOUNTER
Patient states she is having pain all on the left side of her body from her head to her toes. States her left eye is blinking and painful, deep aching in head, achey feeling deep down in bones in left side of body. States she has a dull fever as well. She is just wanting to know if this could be from the penicillin or if she could be coming down with something. It is different than her normal pain. Please advise.

## 2022-01-26 LAB — TREPONEMA PALLIDUM IGG+IGM AB [PRESENCE] IN SERUM OR PLASMA BY IMMUNOASSAY: NON REACTIVE

## 2022-01-28 ENCOUNTER — APPOINTMENT (OUTPATIENT)
Dept: CT IMAGING | Facility: HOSPITAL | Age: 42
End: 2022-01-28

## 2022-01-28 ENCOUNTER — HOSPITAL ENCOUNTER (EMERGENCY)
Facility: HOSPITAL | Age: 42
Discharge: HOME | End: 2022-01-28
Attending: EMERGENCY MEDICINE | Admitting: EMERGENCY MEDICINE

## 2022-01-28 VITALS
OXYGEN SATURATION: 96 % | TEMPERATURE: 98.2 F | SYSTOLIC BLOOD PRESSURE: 110 MMHG | WEIGHT: 244.71 LBS | BODY MASS INDEX: 38.41 KG/M2 | DIASTOLIC BLOOD PRESSURE: 70 MMHG | HEART RATE: 56 BPM | RESPIRATION RATE: 16 BRPM | HEIGHT: 67 IN

## 2022-01-28 DIAGNOSIS — A53.9 SYPHILIS: Primary | ICD-10-CM

## 2022-01-28 DIAGNOSIS — R53.1 GENERALIZED WEAKNESS: ICD-10-CM

## 2022-01-28 DIAGNOSIS — H53.8 BLURRY VISION: ICD-10-CM

## 2022-01-28 DIAGNOSIS — R52 BODY ACHES: ICD-10-CM

## 2022-01-28 DIAGNOSIS — R11.0 NAUSEA: ICD-10-CM

## 2022-01-28 LAB
ALBUMIN SERPL-MCNC: 4.6 G/DL (ref 3.5–5.2)
ALBUMIN/GLOB SERPL: 1.5 G/DL
ALP SERPL-CCNC: 75 U/L (ref 39–117)
ALT SERPL W P-5'-P-CCNC: 15 U/L (ref 1–33)
ANION GAP SERPL CALCULATED.3IONS-SCNC: 14.4 MMOL/L (ref 5–15)
AST SERPL-CCNC: 14 U/L (ref 1–32)
BASOPHILS # BLD AUTO: 0.08 10*3/MM3 (ref 0–0.2)
BASOPHILS NFR BLD AUTO: 0.7 % (ref 0–1.5)
BILIRUB SERPL-MCNC: 0.2 MG/DL (ref 0–1.2)
BILIRUB UR QL STRIP: NEGATIVE
BUN SERPL-MCNC: 11 MG/DL (ref 6–20)
BUN/CREAT SERPL: 11.3 (ref 7–25)
CALCIUM SPEC-SCNC: 9.9 MG/DL (ref 8.6–10.5)
CHLORIDE SERPL-SCNC: 102 MMOL/L (ref 98–107)
CLARITY UR: CLEAR
CO2 SERPL-SCNC: 21.6 MMOL/L (ref 22–29)
COLOR UR: YELLOW
CREAT SERPL-MCNC: 0.97 MG/DL (ref 0.57–1)
DEPRECATED RDW RBC AUTO: 46.8 FL (ref 37–54)
EOSINOPHIL # BLD AUTO: 0.17 10*3/MM3 (ref 0–0.4)
EOSINOPHIL NFR BLD AUTO: 1.4 % (ref 0.3–6.2)
ERYTHROCYTE [DISTWIDTH] IN BLOOD BY AUTOMATED COUNT: 13.7 % (ref 12.3–15.4)
GFR SERPL CREATININE-BSD FRML MDRD: 63 ML/MIN/1.73
GLOBULIN UR ELPH-MCNC: 3 GM/DL
GLUCOSE SERPL-MCNC: 119 MG/DL (ref 65–99)
GLUCOSE UR STRIP-MCNC: NEGATIVE MG/DL
HCG INTACT+B SERPL-ACNC: <0.5 MIU/ML
HCT VFR BLD AUTO: 45.8 % (ref 34–46.6)
HGB BLD-MCNC: 15.6 G/DL (ref 12–15.9)
HGB UR QL STRIP.AUTO: NEGATIVE
HOLD SPECIMEN: NORMAL
HOLD SPECIMEN: NORMAL
IMM GRANULOCYTES # BLD AUTO: 0.05 10*3/MM3 (ref 0–0.05)
IMM GRANULOCYTES NFR BLD AUTO: 0.4 % (ref 0–0.5)
KETONES UR QL STRIP: NEGATIVE
LEUKOCYTE ESTERASE UR QL STRIP.AUTO: NEGATIVE
LYMPHOCYTES # BLD AUTO: 2.41 10*3/MM3 (ref 0.7–3.1)
LYMPHOCYTES NFR BLD AUTO: 19.8 % (ref 19.6–45.3)
MCH RBC QN AUTO: 31.9 PG (ref 26.6–33)
MCHC RBC AUTO-ENTMCNC: 34.1 G/DL (ref 31.5–35.7)
MCV RBC AUTO: 93.7 FL (ref 79–97)
MONOCYTES # BLD AUTO: 0.56 10*3/MM3 (ref 0.1–0.9)
MONOCYTES NFR BLD AUTO: 4.6 % (ref 5–12)
NEUTROPHILS NFR BLD AUTO: 73.1 % (ref 42.7–76)
NEUTROPHILS NFR BLD AUTO: 8.91 10*3/MM3 (ref 1.7–7)
NITRITE UR QL STRIP: NEGATIVE
NRBC BLD AUTO-RTO: 0 /100 WBC (ref 0–0.2)
PH UR STRIP.AUTO: 6.5 [PH] (ref 5–8)
PLATELET # BLD AUTO: 291 10*3/MM3 (ref 140–450)
PMV BLD AUTO: 11.1 FL (ref 6–12)
POTASSIUM SERPL-SCNC: 4.3 MMOL/L (ref 3.5–5.2)
PROT SERPL-MCNC: 7.6 G/DL (ref 6–8.5)
PROT UR QL STRIP: NEGATIVE
RBC # BLD AUTO: 4.89 10*6/MM3 (ref 3.77–5.28)
SODIUM SERPL-SCNC: 138 MMOL/L (ref 136–145)
SP GR UR STRIP: <=1.005 (ref 1–1.03)
UROBILINOGEN UR QL STRIP: NORMAL
WBC NRBC COR # BLD: 12.18 10*3/MM3 (ref 3.4–10.8)
WHOLE BLOOD HOLD SPECIMEN: NORMAL
WHOLE BLOOD HOLD SPECIMEN: NORMAL

## 2022-01-28 PROCEDURE — 84702 CHORIONIC GONADOTROPIN TEST: CPT | Performed by: EMERGENCY MEDICINE

## 2022-01-28 PROCEDURE — 99283 EMERGENCY DEPT VISIT LOW MDM: CPT

## 2022-01-28 PROCEDURE — 81003 URINALYSIS AUTO W/O SCOPE: CPT | Performed by: EMERGENCY MEDICINE

## 2022-01-28 PROCEDURE — 96374 THER/PROPH/DIAG INJ IV PUSH: CPT

## 2022-01-28 PROCEDURE — 85025 COMPLETE CBC W/AUTO DIFF WBC: CPT | Performed by: EMERGENCY MEDICINE

## 2022-01-28 PROCEDURE — 36415 COLL VENOUS BLD VENIPUNCTURE: CPT

## 2022-01-28 PROCEDURE — 80053 COMPREHEN METABOLIC PANEL: CPT | Performed by: EMERGENCY MEDICINE

## 2022-01-28 PROCEDURE — 70450 CT HEAD/BRAIN W/O DYE: CPT

## 2022-01-28 PROCEDURE — 25010000002 ONDANSETRON PER 1 MG: Performed by: NURSE PRACTITIONER

## 2022-01-28 RX ORDER — ONDANSETRON 2 MG/ML
4 INJECTION INTRAMUSCULAR; INTRAVENOUS ONCE
Status: COMPLETED | OUTPATIENT
Start: 2022-01-28 | End: 2022-01-28

## 2022-01-28 RX ORDER — SODIUM CHLORIDE 0.9 % (FLUSH) 0.9 %
10 SYRINGE (ML) INJECTION AS NEEDED
Status: DISCONTINUED | OUTPATIENT
Start: 2022-01-28 | End: 2022-01-29 | Stop reason: HOSPADM

## 2022-01-28 RX ADMIN — ONDANSETRON 4 MG: 2 INJECTION INTRAMUSCULAR; INTRAVENOUS at 21:47

## 2022-01-28 RX ADMIN — SODIUM CHLORIDE 1000 ML: 9 INJECTION, SOLUTION INTRAVENOUS at 21:47

## 2022-01-31 ENCOUNTER — OFFICE VISIT (OUTPATIENT)
Dept: FAMILY MEDICINE CLINIC | Facility: CLINIC | Age: 42
End: 2022-01-31

## 2022-01-31 VITALS
HEART RATE: 70 BPM | OXYGEN SATURATION: 99 % | BODY MASS INDEX: 39.08 KG/M2 | WEIGHT: 243.2 LBS | SYSTOLIC BLOOD PRESSURE: 128 MMHG | HEIGHT: 66 IN | DIASTOLIC BLOOD PRESSURE: 82 MMHG | TEMPERATURE: 97.2 F

## 2022-01-31 DIAGNOSIS — Z12.4 ENCOUNTER FOR PAPANICOLAOU SMEAR OF CERVIX: Primary | ICD-10-CM

## 2022-01-31 PROCEDURE — G0123 SCREEN CERV/VAG THIN LAYER: HCPCS | Performed by: NURSE PRACTITIONER

## 2022-01-31 PROCEDURE — 99396 PREV VISIT EST AGE 40-64: CPT | Performed by: NURSE PRACTITIONER

## 2022-01-31 NOTE — PATIENT INSTRUCTIONS
"https://www.uspreventiveservicestaskforce.org/uspstf/recommendation/lung-cancer-screening\">   Cancer Screening for Women  A cancer screening is a test or exam that checks for cancer. Your health care provider will recommend specific cancer screenings based on your age, medical history (including risk factors), and family history of cancer. Work with your health care provider to create a cancer screening schedule that protects your health.  Who should have screening?  All women should be considered for screening of certain cancers, including breast cancer, cervical cancer, colorectal cancer, and skin cancer. Your health care provider may recommend screenings for other types of cancer if:  · You had cancer before.  · You have a family member with cancer.  · You have abnormal genes that could increase the risk of cancer.  · You have risk factors for certain cancers, such as current or past use of tobacco products, or being overweight.  When you should be screened for cancer depends on:  · Your age.  · Your medical history and your family's medical history.  · Certain lifestyle factors, such as smoking or other use of tobacco products.  · Environmental exposure, such as to asbestos.  How is screening done?  Breast cancer  Breast cancer screening is done with a test that takes images of breast tissue (mammogram). Here are some screening guidelines for women at average risk:  · When you are age 40-44, you will be given the choice to start having mammograms.  · When you are age 45-54, you should have a mammogram every year.  · You may start having mammograms before age 45 if you have risk factors for breast cancer, such as having an immediate family member with breast cancer.  · At age 55 or older, you should have a mammogram every 1-2 years for as long as you are in good health and have a life expectancy of 10 years or longer.  · It is important to know what your breasts look and feel like so you can report any changes to " your health care provider.    Cervical cancer  · All women at average risk should be considered for screening for cervical cancer starting no later than age 25 and continuing until age 65. Screening should not begin earlier than age 21. You will have tests every 3-5 years, depending on your results and the type of screening test. Talk with your health care provider about which screening test is right for you and how often you should be screened.  ? Cervical cancer screening is done with an HPV (human papillomavirus) test to identify the virus that causes cervical cancer. To perform the test, a health care provider takes a swab of cells from yourcervix during a pelvic exam. This test may be performed along with a Pap test. This testchecks for abnormalities in the lowest part of the uterus (cervix).  ? If you have had the HPV vaccine, you will still be screened for cervical cancer and follow normal screening recommendations.  You do not need to be screened for cervical cancer if any of the following apply to you:  · You are older than age 65 and you have had normal screening tests in the past 10 years with no serious cervical precancer or cancer in the last 25 years.  · Your cervix and uterus have been removed, and you have never had cervical cancer or abnormal cells that could become cancer (precancerous cells).  Colorectal cancer  All adults at average risk should be considered for screening for colorectal cancer starting at age 50 and continuing until age 75. Your health care provider may recommend screening at age 45 if you are at higher risk due to family history of colon or rectal cancer or other risk factors. You will have tests every 1-10 years, depending on your results and the type of screening test. Talk with your health care provider about which screening test is right for you and how often you should be screened.  Colorectal cancer screening looks for cancer or for growths called polyps that often form  before cancer starts. Tests to look for cancer or polyps include:  · Colonoscopy or flexible sigmoidoscopy. For these procedures, a flexible tube with a small camera is inserted into the rectum.  · CT colonography. This test uses X-rays and a contrast dye to check the colon for polyps. If a polyp is found, you may need to have a colonoscopy so the polyp can be located and removed.  Tests to look for cancer in the stool (feces) include:  · Guaiac-based fecal occult blood test (FOBT). This test can find blood in stool. It can be done at home with a kit.  · Fecal immunochemical test (FIT). This test can find blood in stool. For this test, you will need to collect stool samples at home.  · Stool DNA test. This test looks for blood in stool and any changes in DNA that can lead to colon cancer. For this test, you will need to collect a stool sample at home and send it to a lab.  Endometrial cancer  There is no standard screening test for endometrial cancer, and women at average risk do not routinely need to have this screening. Talk with your health care provider about whether screening is right for you. If it is, this screening is performed through:  · Endometrial tissue biopsy. This tests a sample of tissue taken from the lining of the uterus.  · Vaginal ultrasound.  If you are at increased risk for endometrial cancer, you may need to have these tests more often than normal. You are at increased risk if:  · You have a family history of ovarian, uterine, or colon cancer.  · You are taking tamoxifen, a medicine used to treat breast cancer.  · You have certain types of colon cancer.  If you have reached menopause, it is especially important to talk with your health care provider about any vaginal bleeding or spotting. Screening for endometrial cancer is not recommended for women who do not have symptoms of the cancer, such as vaginal bleeding.  Lung cancer  Lung cancer screening is done with a CT scan that looks for  abnormal changes in the lungs. Discuss lung cancer screening with your health care provider if you are 50-80 years old and if any of the following apply to you:  · You currently smoke.  · You used to smoke heavily.  · You have a smoking history of 1 pack of cigarettes a day for 20 years or 2 packs a day for 10 years.  · You have quit smoking within the past 15 years.  You may need to be screened every year if you smoke heavily or if you used to smoke.  Skin cancer  Skin cancer screening is done by checking the skin for unusual moles or spots and any changes in existing moles. Your health care provider should check your skin for signs of skin cancer at every physical exam. You should check your skin every month and tell your health care provider right away if anything looks unusual. Women with a higher-than-normal risk for skin cancer may want to see a skin specialist (dermatologist) for an annual body check.  What are the benefits of screening?  Cancer screening is done to look for cancer in the very early stages, before it spreads and becomes harder to treat and before you would start to notice symptoms. Finding cancer early improves the chances of successful treatment. It may save your life.  Where to find more information  · American Cancer Society: www.cancer.org  · Centers for Disease Control and Prevention: www.cdc.gov  · National Cancer Marks: www.cancer.gov  · U.S. Department of Health and Human Services: www.womenshealth.gov  Contact a health care provider if:  You have concerns about any signs or symptoms of cancer. These may include:  · Skin problems. You may have:  ? Moles of an unusual shape or color.  ? Changes in existing moles.  ? A sore on your skin that does not heal.  · Tiredness (fatigue) that does not go away.  · Losing weight without trying.  · Blood in your urine or stool.  · Problems with coughing or breathing. These may include:  ? Coughing or trouble breathing that does not go  away.  ? Coughing up blood.  · Lumps or other changes in your breasts.  · Vaginal bleeding, spotting, or changes in your periods.  · Frequent pain or cramping in your abdomen.  Summary  · Your health care provider will recommend specific cancer screenings based on your age, medical history, and family history of cancer.  · Work with your health care provider to create a cancer screening schedule that protects your health.  · Finding cancer early improves the chances of successful treatment. It may save your life.  · Contact a health care provider if you have concerns about any signs or symptoms of cancer.  This information is not intended to replace advice given to you by your health care provider. Make sure you discuss any questions you have with your health care provider.  Document Revised: 05/10/2021 Document Reviewed: 11/13/2020  Elsevier Patient Education © 2021 [employer]

## 2022-01-31 NOTE — PROGRESS NOTES
Chief Complaint  Gynecologic Exam    Subjective          Nadiya Rodríguez presents to [facility]  History of Present Illness   SUBJECTIVE: 41 y.o. female for annual routine Pap and checkup. She is scheduled for mammogram.  She denies any vaginal concerns.  Current Outpatient Medications   Medication Sig Dispense Refill   • Accu-Chek FastClix Lancets misc USE 1 TO CHECK GLUCOSE ONCE DAILY AND AS NEEDED     • Accu-Chek Guide test strip USE 1 STRIP TO CHECK GLUCOSE ONCE DAILY AND AS NEEDED     • albuterol sulfate  (90 Base) MCG/ACT inhaler Inhale 2 puffs Every 4 (Four) Hours As Needed.     • aspirin 81 MG chewable tablet Chew 81 mg Daily.     • Blood Glucose Monitoring Suppl (Accu-Chek Guide Me) w/Device kit USE ONCE DAILY AND AS NEEDED     • cyanocobalamin 1000 MCG/ML injection Inject 1 mL into the appropriate muscle as directed by prescriber Every 28 (Twenty-Eight) Days. 3 mL 3   • doxycycline (MONODOX) 100 MG capsule Take 1 capsule by mouth 2 (Two) Times a Day. 20 capsule 0   • fluconazole (Diflucan) 100 MG tablet Take 2 tabs day one then one tab daily until finished 11 tablet 0   • fluticasone-salmeterol (Advair Diskus) 250-50 MCG/DOSE DISKUS Inhale 1 puff 2 (Two) Times a Day. 60 each 5   • lamoTRIgine (LaMICtal) 25 MG tablet Take 1 tab PO QHS x 2 weeks, if tolerated take 2 tab PO QHS 60 tablet 1   • metoprolol tartrate (LOPRESSOR) 100 MG tablet Take 1 tablet by mouth Daily. 90 tablet 1   • metroNIDAZOLE (FLAGYL) 500 MG tablet Take 1 tablet by mouth 2 (Two) Times a Day for 7 days. 14 tablet 0   • nystatin (MYCOSTATIN) 503420 UNIT/GM powder Apply  topically to the appropriate area as directed 3 (Three) Times a Day. 45 g 0   • ondansetron ODT (ZOFRAN-ODT) 4 MG disintegrating tablet Place 1 tablet on the tongue Every 6 (Six) Hours As Needed for Nausea or Vomiting. 15 tablet 0   • QUEtiapine (SEROquel) 50 MG tablet Take 0.5 tab PO QHS for sleep. Can take 0.5 tab PO one hour  "later if needed 30 tablet 1   • sertraline (Zoloft) 50 MG tablet Take 1 tablet by mouth Daily. 90 tablet 1   • spironolactone (Aldactone) 25 MG tablet Take 1 tablet by mouth Daily. 30 tablet 2   • Syringe 27G X 1-1/4\" 3 ML misc 1 each Every 28 (Twenty-Eight) Days. For B12 injection 3 each 3   • triamcinolone (KENALOG) 0.1 % cream Apply 1 application topically to the appropriate area as directed 3 (Three) Times a Day. 30 g 0     No current facility-administered medications for this visit.     Allergies: Bactrim [sulfamethoxazole-trimethoprim], Red dye, Hydrochlorothiazide, Ketorolac tromethamine, and Latex   Patient's last menstrual period was 01/05/2022.    ROS:  Feeling well. No dyspnea or chest pain on exertion.  No abdominal pain, change in bowel habits, black or bloody stools.  No urinary tract symptoms. GYN ROS: normal menses, no abnormal bleeding, pelvic pain or discharge, no breast pain or new or enlarging lumps on self exam. No neurological complaints.    OBJECTIVE:   The patient appears well, alert, oriented x 3, in no distress.  /82   Pulse 70   Temp 97.2 °F (36.2 °C)   Ht 167.6 cm (66\")   Wt 110 kg (243 lb 3.2 oz)   LMP 01/05/2022   SpO2 99%   BMI 39.25 kg/m²   Neck supple. No adenopathy or thyromegaly. Lungs are clear, good air entry, no wheezes, rhonchi or rales. S1 and S2 normal, no murmurs, regular rate and rhythm. Abdomen soft without tenderness, guarding, mass or organomegaly. Extremities show no edema, normal peripheral pulses. Neurological is normal, no focal findings.    BREAST EXAM: breasts appear normal, no suspicious masses, no skin or nipple changes or axillary nodes    PELVIC EXAM: normal external genitalia, vulva, vagina, cervix, uterus and adnexa    ASSESSMENT:   well woman    PLAN:   mammogram  pap smear  counseled on breast self exam    Objective   Vital Signs:   /82   Pulse 70   Temp 97.2 °F (36.2 °C)   Ht 167.6 cm (66\")   Wt 110 kg (243 lb 3.2 oz)   SpO2 99%   " BMI 39.25 kg/m²        Result Review :                 Assessment and Plan    Diagnoses and all orders for this visit:    1. Encounter for Papanicolaou smear of cervix (Primary)  -     IGP, Rfx Aptima HPV ASCU        Follow Up {Instructions Charge Capture  Follow-up Communications :23}  Return in about 3 months (around 4/30/2022) for Next scheduled follow up diabetes.  Patient was given instructions and counseling regarding her condition or for health maintenance advice. Please see specific information pulled into the AVS if appropriate.

## 2022-02-04 LAB
CONV .: NORMAL
CYTOLOGIST CVX/VAG CYTO: NORMAL
CYTOLOGY CVX/VAG DOC CYTO: NORMAL
CYTOLOGY CVX/VAG DOC THIN PREP: NORMAL
DX ICD CODE: NORMAL
HIV 1 & 2 AB SER-IMP: NORMAL
OTHER STN SPEC: NORMAL
STAT OF ADQ CVX/VAG CYTO-IMP: NORMAL

## 2022-02-09 ENCOUNTER — TELEPHONE (OUTPATIENT)
Dept: PHYSICAL THERAPY | Facility: OTHER | Age: 42
End: 2022-02-09

## 2022-02-15 ENCOUNTER — TRANSCRIBE ORDERS (OUTPATIENT)
Dept: ADMINISTRATIVE | Facility: HOSPITAL | Age: 42
End: 2022-02-15

## 2022-02-15 DIAGNOSIS — R53.1 ASTHENIA: ICD-10-CM

## 2022-02-15 DIAGNOSIS — R15.9 ENCOPRESIS WITHOUT CONSTIPATION AND OVERFLOW INCONTINENCE: ICD-10-CM

## 2022-02-15 DIAGNOSIS — R53.1 WEAKNESS GENERALIZED: Primary | ICD-10-CM

## 2022-02-21 ENCOUNTER — TRANSCRIBE ORDERS (OUTPATIENT)
Dept: ADMINISTRATIVE | Facility: HOSPITAL | Age: 42
End: 2022-02-21

## 2022-02-21 DIAGNOSIS — R13.10 DIFFICULTY SWALLOWING LIQUIDS: Primary | ICD-10-CM

## 2022-02-21 DIAGNOSIS — R13.10 DIFFICULTY SWALLOWING SOLIDS: ICD-10-CM

## 2022-02-28 RX ORDER — SPIRONOLACTONE 25 MG/1
TABLET ORAL
Qty: 30 TABLET | Refills: 2 | Status: SHIPPED | OUTPATIENT
Start: 2022-02-28 | End: 2022-07-06 | Stop reason: SDUPTHER

## 2022-03-03 ENCOUNTER — HOSPITAL ENCOUNTER (OUTPATIENT)
Dept: MRI IMAGING | Facility: HOSPITAL | Age: 42
Discharge: HOME | End: 2022-03-03

## 2022-03-03 DIAGNOSIS — R53.1 ASTHENIA: ICD-10-CM

## 2022-03-03 DIAGNOSIS — R53.1 WEAKNESS GENERALIZED: ICD-10-CM

## 2022-03-03 DIAGNOSIS — R15.9 ENCOPRESIS WITHOUT CONSTIPATION AND OVERFLOW INCONTINENCE: ICD-10-CM

## 2022-03-03 LAB
CREAT BLDA-MCNC: 0.7 MG/DL
EGFRCR SERPLBLD CKD-EPI 2021: 110.9 ML/MIN/1.73

## 2022-03-03 PROCEDURE — 72148 MRI LUMBAR SPINE W/O DYE: CPT

## 2022-03-03 PROCEDURE — A9577 INJ MULTIHANCE: HCPCS | Performed by: NURSE PRACTITIONER

## 2022-03-03 PROCEDURE — 0 GADOBENATE DIMEGLUMINE 529 MG/ML SOLUTION: Performed by: NURSE PRACTITIONER

## 2022-03-03 PROCEDURE — 82565 ASSAY OF CREATININE: CPT

## 2022-03-03 PROCEDURE — 70553 MRI BRAIN STEM W/O & W/DYE: CPT

## 2022-03-03 RX ADMIN — GADOBENATE DIMEGLUMINE 20 ML: 529 INJECTION, SOLUTION INTRAVENOUS at 19:42

## 2022-03-11 ENCOUNTER — TELEPHONE (OUTPATIENT)
Dept: FAMILY MEDICINE CLINIC | Facility: CLINIC | Age: 42
End: 2022-03-11

## 2022-03-18 ENCOUNTER — APPOINTMENT (OUTPATIENT)
Dept: GENERAL RADIOLOGY | Facility: HOSPITAL | Age: 42
End: 2022-03-18

## 2022-03-22 ENCOUNTER — TELEPHONE (OUTPATIENT)
Dept: FAMILY MEDICINE CLINIC | Facility: CLINIC | Age: 42
End: 2022-03-22

## 2022-03-22 RX ORDER — DIAPER,BRIEF,INFANT-TODD,DISP
1 EACH MISCELLANEOUS 2 TIMES DAILY
Qty: 28 G | Refills: 0 | Status: SHIPPED | OUTPATIENT
Start: 2022-03-22 | End: 2022-08-30

## 2022-03-22 NOTE — TELEPHONE ENCOUNTER
I attempted to call patient but her phone number was not working.  I attempted calling her  on his phone number that received his voicemail, left a message for them to call back.

## 2022-03-22 NOTE — TELEPHONE ENCOUNTER
Patient return call, she states that she did get some over-the-counter medication for her constipation and did have a bowel movement this morning.  She does have severe hemorrhoids both internal and external that is causing her severe pain.  She states she does not believe she could sit in a vehicle and that she has considered calling EMS to take her to the hospital.  She states she is a little better today so she is going to wait.  She states she is not voiding very much and that she is trying to get down broth and some fluids.  She does have some nausea, states she is not taking the Zofran because she is having trouble keeping anything down.  Advised her to take the Zofran because it is an oral disintegrating tablet and then to push fluids.  Advised her to try sitting in warm bath water to relieve her symptoms.  I will send her in something for her hemorrhoids.  Advised her that if she does not improve she needs to go to the emergency room.  Patient verbalizes understanding.

## 2022-03-22 NOTE — TELEPHONE ENCOUNTER
SPOKE WITH PATIENT ABOUT MISSED APPOINTMENT TODAY 3/22. PATIENT HAD ALREADY TALKED TO SOMEONE IN OFFICE ABOUT THE MISSED APPOINTMENT. MADE SURE THEY WERE AWARE ABOUT POLICY

## 2022-03-22 NOTE — TELEPHONE ENCOUNTER
Caller: Nadiya Rodríguez    Relationship: Self    Best call back number: [phone]    Who are you requesting to speak with (clinical staff, provider,  specific staff member): CLINICAL STAFF    What was the call regarding: ISSUES RELATED TO THE APPOINTMENT CANCELED TODAY 03.22.2022    Do you require a callback: YES

## 2022-03-22 NOTE — TELEPHONE ENCOUNTER
The reason why patient has cancelled her appointment for today is,The patient stated that the syphilis has moved to her rectum and her hemorrhoids   are now inflamed. She is having a hard time walking. She hasn't been able to go to the bathroom in over a week and hasn't been able to take her medication. The patient is stating that she is in a lot of pain. I told  that if the pain gets to much to tolerate that she can go to the ER. She said that she has been thinking about it. Please call the patient and advise.

## 2022-03-25 ENCOUNTER — TELEPHONE (OUTPATIENT)
Dept: PSYCHIATRY | Facility: CLINIC | Age: 42
End: 2022-03-25

## 2022-03-28 ENCOUNTER — TELEPHONE (OUTPATIENT)
Dept: PSYCHIATRY | Facility: CLINIC | Age: 42
End: 2022-03-28

## 2022-03-28 NOTE — TELEPHONE ENCOUNTER
Pt states she d/c lamictal although it did help her mood. She tried seroquel and got a few more hours of sleep but had significant agitation and was very mean on this med. Pt was recently sick and wasn't able to hold down any meds so that's why she discontinued the lamictal. Will schedule pt for tomorrow morning at 8:20 telehealth and will reassess and figure out medications. Pt is agreeable to this plan. Addressed all questions and concerns.

## 2022-03-28 NOTE — TELEPHONE ENCOUNTER
Pt says that she is trying to reach her provider and does not want to share what is going on with staff.  She says she needs to speak directly to the provider

## 2022-03-29 ENCOUNTER — TELEMEDICINE (OUTPATIENT)
Dept: BEHAVIORAL HEALTH | Facility: CLINIC | Age: 42
End: 2022-03-29

## 2022-03-29 DIAGNOSIS — F41.9 ANXIETY: ICD-10-CM

## 2022-03-29 DIAGNOSIS — F43.10 POST TRAUMATIC STRESS DISORDER (PTSD): ICD-10-CM

## 2022-03-29 DIAGNOSIS — F31.30 BIPOLAR AFFECTIVE DISORDER, CURRENT EPISODE DEPRESSED, CURRENT EPISODE SEVERITY UNSPECIFIED: ICD-10-CM

## 2022-03-29 DIAGNOSIS — G47.00 INSOMNIA, UNSPECIFIED TYPE: Primary | ICD-10-CM

## 2022-03-29 PROCEDURE — 99213 OFFICE O/P EST LOW 20 MIN: CPT | Performed by: PHYSICIAN ASSISTANT

## 2022-03-29 RX ORDER — LAMOTRIGINE 25 MG/1
TABLET ORAL
Qty: 60 TABLET | Refills: 1 | Status: SHIPPED | OUTPATIENT
Start: 2022-03-29 | End: 2022-05-09

## 2022-03-29 RX ORDER — MIRTAZAPINE 15 MG/1
15 TABLET, FILM COATED ORAL NIGHTLY
Qty: 30 TABLET | Refills: 2 | Status: SHIPPED | OUTPATIENT
Start: 2022-03-29 | End: 2022-04-01

## 2022-03-29 NOTE — PROGRESS NOTES
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 11/15/2021 108 (A) 65 - 99 mg/dL Final   • BUN 11/15/2021 12  6 - 20 mg/dL Final   • Creatinine 11/15/2021 0.82  0.57 - 1.00 mg/dL Final   • Sodium 11/15/2021 139  136 - 145 mmol/L Final   • Potassium 11/15/2021 4.2  3.5 - 5.2 mmol/L Final   • Chloride 11/15/2021 105  98 - 107 mmol/L Final   • CO2 11/15/2021 23.7  22.0 - 29.0 mmol/L Final   • Calcium 11/15/2021 9.0  8.6 - 10.5 mg/dL Final   • Total Protein 11/15/2021 6.9  6.0 - 8.5 g/dL Final   • Albumin 11/15/2021 4.30  3.50 - 5.20 g/dL Final   • ALT (SGPT) 11/15/2021 26  1 - 33 U/L Final   • AST (SGOT) 11/15/2021 17  1 - 32 U/L Final   • Alkaline Phosphatase 11/15/2021 70  39 - 117 U/L Final   • Total Bilirubin 11/15/2021 <0.2  0.0 - 1.2 mg/dL Final   • eGFR Non  Amer 11/15/2021 76  >60 mL/min/1.73 Final   • Globulin 11/15/2021 2.6  gm/dL Final   • A/G Ratio 11/15/2021 1.7  g/dL Final   • BUN/Creatinine Ratio 11/15/2021 14.6  7.0 - 25.0 Final   • Anion Gap 11/15/2021 10.3  5.0 - 15.0 mmol/L Final   • Total Cholesterol 11/15/2021 177  0 - 200 mg/dL Final   • Triglycerides 11/15/2021 117  0 - 150 mg/dL Final   • HDL Cholesterol 11/15/2021 35 (A) 40 - 60 mg/dL Final   • LDL Cholesterol  11/15/2021 121 (A) 0 - 100 mg/dL Final   • VLDL Cholesterol 11/15/2021 21  5 - 40 mg/dL Final   • LDL/HDL Ratio 11/15/2021 3.39   Final       EKG Results:  No orders to display       Imaging Results:  No Images in the  past 120 days found..      Assessment/Plan   Diagnoses and all orders for this visit:    1. Insomnia, unspecified type (Primary)  -     lamoTRIgine (LaMICtal) 25 MG tablet; Take 1 tab PO QHS x 2 weeks, if tolerated take 2 tab PO QHS  Dispense: 60 tablet; Refill: 1  -     mirtazapine (Remeron) 15 MG tablet; Take 1 tablet by mouth Every Night for 30 days.  Dispense: 30 tablet; Refill: 2    2. Bipolar affective disorder, current episode depressed, current episode severity unspecified (HCC)  -     lamoTRIgine (LaMICtal) 25 MG tablet; Take 1 tab PO QHS x 2 weeks, if tolerated take 2 tab PO QHS  Dispense: 60 tablet; Refill: 1    3. Anxiety  -     lamoTRIgine (LaMICtal) 25 MG tablet; Take 1 tab PO QHS x 2 weeks, if tolerated take 2 tab PO QHS  Dispense: 60 tablet; Refill: 1    4. Post traumatic stress disorder (PTSD)  -     lamoTRIgine (LaMICtal) 25 MG tablet; Take 1 tab PO QHS x 2 weeks, if tolerated take 2 tab PO QHS  Dispense: 60 tablet; Refill: 1    Presentation seems most consistent with bipolar disorder, anxiety, PTSD, and insomnia. Will restart the patient on Lamictal as she reports doing very well on this med. Will continue with Lamictal for bipolar, depression, anxiety, PTSD, and overall mood. Will d/c Seroquel due to side effects reported. Will start the patient on mirtazapine for sleep as needed. F/u in 4 weeks. Reiterated the need for therapy. Addressed all questions and concerns.      Visit Diagnoses:    ICD-10-CM ICD-9-CM   1. Insomnia, unspecified type  G47.00 780.52   2. Bipolar affective disorder, current episode depressed, current episode severity unspecified (HCC)  F31.30 296.50   3. Anxiety  F41.9 300.00   4. Post traumatic stress disorder (PTSD)  F43.10 309.81       PLAN:  1. Safety: No acute safety concerns at this time.  2. Therapy: Will refer patient for psychotherapy.  3. Risk Assessment: Risk of self-harm acutely is moderate to high.  Risk factors include anxiety disorder, mood disorder, family  history of daughter with suicide attempt, access to guns, h/o suicide attempt and self harm in the past, AODA, and recent psychosocial stressors (pandemic). Protective factors include no present SI, healthcare seeking, future orientation, willingness to engage in care.  Risk of self-harm chronically is also moderate to high, but could be further elevated in the event of treatment noncompliance and/or AODA.  4. Labs/Diagnostics Ordered:   No orders of the defined types were placed in this encounter.    5. Medications:   New Medications Ordered This Visit   Medications   • lamoTRIgine (LaMICtal) 25 MG tablet     Sig: Take 1 tab PO QHS x 2 weeks, if tolerated take 2 tab PO QHS     Dispense:  60 tablet     Refill:  1   • mirtazapine (Remeron) 15 MG tablet     Sig: Take 1 tablet by mouth Every Night for 30 days.     Dispense:  30 tablet     Refill:  2       Discussed all risks, benefits, alternatives, and side effects of Lamictal, including but not limited to rash, rebound depressive or manic symptoms if prompt discontinuation, GI upset, agitation, and sedation. Pt instructed to avoid driving and doing other tasks or actions that require to be alert until knowing how the drug affects them.  Pt informed that birth control pills and other hormone-based birth control may not work as well to prevent pregnancy and advised to use some other kind of birth control, such as condoms, while taking this med. Discussed the need for pt to immediately call the office for any new or worsening symptoms, such as rash, worsening depression; feeling nervous or restless; suicidal thoughts or actions; or other changes changes in mood or behavior, and all other concerns. Pt educated on med compliance and the risks of suddenly stopping this medication or missing doses. Pt verbalized understanding and is agreeable to taking Lamictal. Addressed all questions and concerns.     Discussed all risks, benefits, alternatives, and side effects of  Mirtazapine including but not limited to GI upset, sexual dysfunction, weight gain, dizziness, bleeding risk, seizure risk, sedation, headache, activation of hong or hypomania, drug-inducted movement disorders (akathisia, dystonia, restless leg syndrome), dyslipidemia, hematologic abnormalities, orthostatic hypotension, sedation, withdrawal syndrome, serotonin syndrome, and activation of suicidal ideation and behavior.  Pt educated on the need to practice safe sex while taking this med. Discussed the need for pt to immediately call the office for any new or worsening symptoms, such as worsening depression; feeling nervous or restless; suicidal thoughts or actions; or other changes changes in mood or behavior, and all other concerns. Pt educated on med compliance and the risks of suddenly stopping this medication or missing doses. Pt verbalized understanding and is agreeable to taking Mirtazapine. Addressed all questions and concerns.       6. Follow up:   F/u in 4 weeks.     TREATMENT PLAN/GOALS: Continue supportive psychotherapy efforts and medications as indicated. Treatment and medication options discussed during today's visit. Patient ackowledged and verbally consented to continue with current treatment plan and was educated on the importance of compliance with treatment and follow-up appointments.    MEDICATION ISSUES:  CAMMY reviewed as expected.  Discussed medication options and treatment plan of prescribed medication as well as the risks, benefits, and side effects including potential falls, possible impaired driving and metabolic adversities among others. Patient is agreeable to call the office with any worsening of symptoms or onset of side effects. Patient is agreeable to call 911 or go to the nearest ER should he/she begin having SI/HI. No medication side effects or related complaints today.            This document has been electronically signed by Zulma Guerrero PA-C  March 31, 2022 16:17  EDT      Part of this note may be an electronic transcription/translation of spoken language to printed text using the Dragon Dictation System.

## 2022-03-30 ENCOUNTER — TELEPHONE (OUTPATIENT)
Dept: PSYCHIATRY | Facility: CLINIC | Age: 42
End: 2022-03-30

## 2022-04-01 DIAGNOSIS — G47.00 INSOMNIA, UNSPECIFIED TYPE: Primary | ICD-10-CM

## 2022-04-01 RX ORDER — TRAZODONE HYDROCHLORIDE 50 MG/1
TABLET ORAL
Qty: 30 TABLET | Refills: 1 | Status: SHIPPED | OUTPATIENT
Start: 2022-04-01 | End: 2022-04-21 | Stop reason: SDUPTHER

## 2022-04-05 ENCOUNTER — OFFICE VISIT (OUTPATIENT)
Dept: FAMILY MEDICINE CLINIC | Facility: CLINIC | Age: 42
End: 2022-04-05

## 2022-04-05 VITALS
SYSTOLIC BLOOD PRESSURE: 130 MMHG | WEIGHT: 236.2 LBS | HEIGHT: 66 IN | TEMPERATURE: 98.3 F | OXYGEN SATURATION: 98 % | DIASTOLIC BLOOD PRESSURE: 82 MMHG | BODY MASS INDEX: 37.96 KG/M2 | HEART RATE: 77 BPM

## 2022-04-05 DIAGNOSIS — G89.29 CHRONIC BILATERAL LOW BACK PAIN WITH BILATERAL SCIATICA: ICD-10-CM

## 2022-04-05 DIAGNOSIS — I10 PRIMARY HYPERTENSION: ICD-10-CM

## 2022-04-05 DIAGNOSIS — E11.9 CONTROLLED TYPE 2 DIABETES MELLITUS WITHOUT COMPLICATION, WITHOUT LONG-TERM CURRENT USE OF INSULIN: Primary | ICD-10-CM

## 2022-04-05 DIAGNOSIS — M54.41 CHRONIC BILATERAL LOW BACK PAIN WITH BILATERAL SCIATICA: ICD-10-CM

## 2022-04-05 DIAGNOSIS — M54.42 CHRONIC BILATERAL LOW BACK PAIN WITH BILATERAL SCIATICA: ICD-10-CM

## 2022-04-05 LAB
ALBUMIN SERPL-MCNC: 4 G/DL (ref 3.5–5.2)
ALBUMIN UR-MCNC: <1.2 MG/DL
ALBUMIN/GLOB SERPL: 1.6 G/DL
ALP SERPL-CCNC: 75 U/L (ref 39–117)
ALT SERPL W P-5'-P-CCNC: 18 U/L (ref 1–33)
ANION GAP SERPL CALCULATED.3IONS-SCNC: 10.3 MMOL/L (ref 5–15)
AST SERPL-CCNC: 15 U/L (ref 1–32)
BILIRUB SERPL-MCNC: <0.2 MG/DL (ref 0–1.2)
BUN SERPL-MCNC: 14 MG/DL (ref 6–20)
BUN/CREAT SERPL: 19.2 (ref 7–25)
CALCIUM SPEC-SCNC: 9.3 MG/DL (ref 8.6–10.5)
CHLORIDE SERPL-SCNC: 107 MMOL/L (ref 98–107)
CHOLEST SERPL-MCNC: 132 MG/DL (ref 0–200)
CO2 SERPL-SCNC: 20.7 MMOL/L (ref 22–29)
CREAT SERPL-MCNC: 0.73 MG/DL (ref 0.57–1)
CREAT UR-MCNC: 84.6 MG/DL
EGFRCR SERPLBLD CKD-EPI 2021: 104.8 ML/MIN/1.73
GLOBULIN UR ELPH-MCNC: 2.5 GM/DL
GLUCOSE SERPL-MCNC: 102 MG/DL (ref 65–99)
HBA1C MFR BLD: 6 % (ref 4.8–5.6)
HDLC SERPL-MCNC: 46 MG/DL (ref 40–60)
LDLC SERPL CALC-MCNC: 74 MG/DL (ref 0–100)
LDLC/HDLC SERPL: 1.64 {RATIO}
MICROALBUMIN/CREAT UR: NORMAL MG/G{CREAT}
POTASSIUM SERPL-SCNC: 4.5 MMOL/L (ref 3.5–5.2)
PROT SERPL-MCNC: 6.5 G/DL (ref 6–8.5)
SODIUM SERPL-SCNC: 138 MMOL/L (ref 136–145)
TRIGL SERPL-MCNC: 52 MG/DL (ref 0–150)
VLDLC SERPL-MCNC: 12 MG/DL (ref 5–40)

## 2022-04-05 PROCEDURE — 36415 COLL VENOUS BLD VENIPUNCTURE: CPT | Performed by: NURSE PRACTITIONER

## 2022-04-05 PROCEDURE — 80061 LIPID PANEL: CPT | Performed by: NURSE PRACTITIONER

## 2022-04-05 PROCEDURE — 83036 HEMOGLOBIN GLYCOSYLATED A1C: CPT | Performed by: NURSE PRACTITIONER

## 2022-04-05 PROCEDURE — 82570 ASSAY OF URINE CREATININE: CPT | Performed by: NURSE PRACTITIONER

## 2022-04-05 PROCEDURE — 99215 OFFICE O/P EST HI 40 MIN: CPT | Performed by: NURSE PRACTITIONER

## 2022-04-05 PROCEDURE — 80053 COMPREHEN METABOLIC PANEL: CPT | Performed by: NURSE PRACTITIONER

## 2022-04-05 PROCEDURE — 82043 UR ALBUMIN QUANTITATIVE: CPT | Performed by: NURSE PRACTITIONER

## 2022-04-05 RX ORDER — TIZANIDINE 4 MG/1
4 TABLET ORAL EVERY 6 HOURS PRN
Qty: 60 TABLET | Refills: 2 | Status: SHIPPED | OUTPATIENT
Start: 2022-04-05 | End: 2023-03-01 | Stop reason: SDUPTHER

## 2022-04-05 NOTE — PROGRESS NOTES
Chief Complaint  bowel issues    Subjective          Nadiya Damari Rodríguez presents to [facility]  History of Present Illness   She presents today for follow-up on her bowel issues.  She was having some severe constipation and hemorrhoids but states she is much better now.    Diabetes type 2, controlled with diet: Her A1c was 6.6% 4 months ago. She not currently on any medication for diabetes and is trying to work on losing weight. She is actively losing weight and has lost 18 pounds in the past 3 months.      She is having chronic lower back pain with bilateral sciatica.  She would like a prescription for a muscle relaxer.  She states she cannot take NSAIDs because they give her GI upset.  She had an MRI of her lumbar spine recently which showed moderate to severe osteoarthritis throughout her spine.  She also has some spinal stenosis.    Hypertension: Her blood pressure stable 130/82 on metoprolol 100 mg daily.  She is also on spironolactone 25 mg daily.    Depression/anxiety/bipolar: She is following with psychiatry, currently stable on Lamictal.  She states that she is taking trazodone at bedtime to help with insomnia.  She states she was smoking marijuana to help with her pain and anxiety but she has stopped using marijuana.  She states when she was using the marijuana it did not agree with her trazodone.  She states she is also working on decreasing smoking cigarettes, states she is down to 5 or 6 cigarettes/day.    Current Outpatient Medications on File Prior to Visit   Medication Sig Dispense Refill   • Accu-Chek FastClix Lancets misc USE 1 TO CHECK GLUCOSE ONCE DAILY AND AS NEEDED     • Accu-Chek Guide test strip USE 1 STRIP TO CHECK GLUCOSE ONCE DAILY AND AS NEEDED     • albuterol sulfate  (90 Base) MCG/ACT inhaler Inhale 2 puffs Every 4 (Four) Hours As Needed.     • aspirin 81 MG chewable tablet Chew 81 mg Daily.     • Blood Glucose Monitoring Suppl (Accu-Chek  "Guide Me) w/Device kit USE ONCE DAILY AND AS NEEDED     • cyanocobalamin 1000 MCG/ML injection Inject 1 mL into the appropriate muscle as directed by prescriber Every 28 (Twenty-Eight) Days. 3 mL 3   • fluticasone-salmeterol (Advair Diskus) 250-50 MCG/DOSE DISKUS Inhale 1 puff 2 (Two) Times a Day. 60 each 5   • hydrocortisone 0.5 % cream Apply 1 application topically to the appropriate area as directed 2 (Two) Times a Day. 28 g 0   • lamoTRIgine (LaMICtal) 25 MG tablet Take 1 tab PO QHS x 2 weeks, if tolerated take 2 tab PO QHS 60 tablet 1   • metoprolol tartrate (LOPRESSOR) 100 MG tablet Take 1 tablet by mouth Daily. 90 tablet 1   • nystatin (MYCOSTATIN) 749708 UNIT/GM powder Apply  topically to the appropriate area as directed 3 (Three) Times a Day. 45 g 0   • ondansetron ODT (ZOFRAN-ODT) 4 MG disintegrating tablet Place 1 tablet on the tongue Every 6 (Six) Hours As Needed for Nausea or Vomiting. 15 tablet 0   • spironolactone (ALDACTONE) 25 MG tablet Take 1 tablet by mouth once daily 30 tablet 2   • Syringe 27G X 1-1/4\" 3 ML misc 1 each Every 28 (Twenty-Eight) Days. For B12 injection 3 each 3   • traZODone (DESYREL) 50 MG tablet Take 0.5 tab PO QHS for sleep. Can take 0.5 tab PO one hour later if needed. 30 tablet 1   • triamcinolone (KENALOG) 0.1 % cream Apply 1 application topically to the appropriate area as directed 3 (Three) Times a Day. 30 g 0   • [DISCONTINUED] doxycycline (MONODOX) 100 MG capsule Take 1 capsule by mouth 2 (Two) Times a Day. 20 capsule 0   • [DISCONTINUED] fluconazole (Diflucan) 100 MG tablet Take 2 tabs day one then one tab daily until finished 11 tablet 0     No current facility-administered medications on file prior to visit.       Objective   Vital Signs:   /82   Pulse 77   Temp 98.3 °F (36.8 °C)   Ht 167.6 cm (66\")   Wt 107 kg (236 lb 3.2 oz)   SpO2 98%   BMI 38.12 kg/m²     Physical Exam  Vitals reviewed.   Constitutional:       Appearance: Normal appearance. She is " well-developed. She is obese.   Neck:      Thyroid: No thyroid mass, thyromegaly or thyroid tenderness.   Cardiovascular:      Rate and Rhythm: Normal rate and regular rhythm.      Heart sounds: No murmur heard.    No friction rub. No gallop.   Pulmonary:      Effort: Pulmonary effort is normal.      Breath sounds: Normal breath sounds. No wheezing or rhonchi.   Lymphadenopathy:      Cervical: No cervical adenopathy.   Skin:     General: Skin is warm and dry.   Neurological:      Mental Status: She is alert and oriented to person, place, and time.      Cranial Nerves: No cranial nerve deficit.   Psychiatric:         Mood and Affect: Mood and affect normal.         Behavior: Behavior normal.         Thought Content: Thought content normal. Thought content does not include homicidal or suicidal ideation.         Judgment: Judgment normal.        Result Review :     CMP    CMP 1/17/22 1/28/22 3/3/22   Glucose 102 (A) 119 (A)    BUN 10 11    Creatinine 0.93 0.97 0.70   eGFR Non African Am 66 63    Sodium 138 138    Potassium 4.1 4.3    Chloride 101 102    Calcium 9.7 9.9    Albumin 4.70 4.60    Total Bilirubin 0.2 0.2    Alkaline Phosphatase 68 75    AST (SGOT) 21 14    ALT (SGPT) 27 15    (A) Abnormal value       Comments are available for some flowsheets but are not being displayed.           CBC    CBC 4/20/21 1/17/22 1/28/22   WBC 6.83 11.50 (A) 12.18 (A)   RBC 4.91 4.91 4.89   Hemoglobin 14.9 15.3 15.6   Hematocrit 45.7 45.6 45.8   MCV 93.1 92.9 93.7   MCH 30.3 31.2 31.9   MCHC 32.6 33.6 34.1   RDW 13.6 13.5 13.7   Platelets 282 321 291   (A) Abnormal value            Lipid Panel    Lipid Panel 11/15/21   Total Cholesterol 177   Triglycerides 117   HDL Cholesterol 35 (A)   VLDL Cholesterol 21   LDL Cholesterol  121 (A)   LDL/HDL Ratio 3.39   (A) Abnormal value            TSH    TSH 4/20/21   TSH 0.830      Comments are available for some flowsheets but are not being displayed.           A1C Last 3 Results    HGBA1C  Last 3 Results 4/20/21 11/15/21   Hemoglobin A1C 5.8 (A) 6.60 (A)   (A) Abnormal value       Comments are available for some flowsheets but are not being displayed.           Data reviewed: Radiologic studies MRI of lumbar spine reviewed today.          Assessment and Plan    Diagnoses and all orders for this visit:    1. Controlled type 2 diabetes mellitus without complication, without long-term current use of insulin (HCC) (Primary)  Assessment & Plan:  Diabetes is improving with lifestyle modifications.   Continue current treatment regimen.  Dietary recommendations for ADA diet.  Discussed foot care.  Reminded to get yearly retinal exam.  We will check an A1c today with her labs.  Diabetes will be reassessed in 3 months.    Orders:  -     Hemoglobin A1c  -     Comprehensive Metabolic Panel  -     Lipid Panel  -     Microalbumin / Creatinine Urine Ratio - Urine, Clean Catch    2. Chronic bilateral low back pain with bilateral sciatica  Assessment & Plan:  I will start her on Zanaflex, advised her that she can take it up to 4 times per day but advised her will cause drowsiness and not to take it if she is going to be driving.  I also discussed with her to do back exercises/stretches, handout provided, see AVS form.      3. Primary hypertension  Assessment & Plan:  Hypertension is improving with treatment.  Continue current treatment regimen.  Continue current medications.  Blood pressure will be reassessed in 3 months.      Other orders  -     tiZANidine (ZANAFLEX) 4 MG tablet; Take 1 tablet by mouth Every 6 (Six) Hours As Needed (back pain).  Dispense: 60 tablet; Refill: 2  While patient was getting her labs drawn, she started having twitching and pain in her fingers of her right hand.  She has carpal tunnel syndrome and states that she was having pain in the wrist area.  Her right middle finger was actively twitching.  She was not having any other symptoms, no weakness, no facial drooping or numbness.  She states  she has had the twitching in her right index finger previously.  We applied ice to the peripheral stick site.  She states she will discuss this with her neurologist Dr. Griffin.    I spent 40 minutes caring for Nadiya on this date of service. This time includes time spent by me in the following activities:preparing for the visit, reviewing tests, performing a medically appropriate examination and/or evaluation , counseling and educating the patient/family/caregiver, ordering medications, tests, or procedures, documenting information in the medical record and independently interpreting results and communicating that information with the patient/family/caregiver  Follow Up   Return in about 3 months (around 7/5/2022) for Next scheduled follow up.  Patient was given instructions and counseling regarding her condition or for health maintenance advice. Please see specific information pulled into the AVS if appropriate.

## 2022-04-05 NOTE — ASSESSMENT & PLAN NOTE
I will start her on Zanaflex, advised her that she can take it up to 4 times per day but advised her will cause drowsiness and not to take it if she is going to be driving.  I also discussed with her to do back exercises/stretches, handout provided, see AVS form.

## 2022-04-05 NOTE — PATIENT INSTRUCTIONS
Diabetes Mellitus and Nutrition, Adult  When you have diabetes, or diabetes mellitus, it is very important to have healthy eating habits because your blood sugar (glucose) levels are greatly affected by what you eat and drink. Eating healthy foods in the right amounts, at about the same times every day, can help you:  Control your blood glucose.  Lower your risk of heart disease.  Improve your blood pressure.  Reach or maintain a healthy weight.  What can affect my meal plan?  Every person with diabetes is different, and each person has different needs for a meal plan. Your health care provider may recommend that you work with a dietitian to make a meal plan that is best for you. Your meal plan may vary depending on factors such as:  The calories you need.  The medicines you take.  Your weight.  Your blood glucose, blood pressure, and cholesterol levels.  Your activity level.  Other health conditions you have, such as heart or kidney disease.  How do carbohydrates affect me?  Carbohydrates, also called carbs, affect your blood glucose level more than any other type of food. Eating carbs naturally raises the amount of glucose in your blood. Carb counting is a method for keeping track of how many carbs you eat. Counting carbs is important to keep your blood glucose at a healthy level, especially if you use insulin or take certain oral diabetes medicines.  It is important to know how many carbs you can safely have in each meal. This is different for every person. Your dietitian can help you calculate how many carbs you should have at each meal and for each snack.  How does alcohol affect me?  Alcohol can cause a sudden decrease in blood glucose (hypoglycemia), especially if you use insulin or take certain oral diabetes medicines. Hypoglycemia can be a life-threatening condition. Symptoms of hypoglycemia, such as sleepiness, dizziness, and confusion, are similar to symptoms of having too much alcohol.  Do not drink  "alcohol if:  Your health care provider tells you not to drink.  You are pregnant, may be pregnant, or are planning to become pregnant.  If you drink alcohol:  Do not drink on an empty stomach.  Limit how much you use to:  0-1 drink a day for women.  0-2 drinks a day for men.  Be aware of how much alcohol is in your drink. In the [place] one drink equals one 12 oz bottle of beer (355 mL), one 5 oz glass of wine (148 mL), or one 1½ oz glass of hard liquor (44 mL).  Keep yourself hydrated with water, diet soda, or unsweetened iced tea.  Keep in mind that regular soda, juice, and other mixers may contain a lot of sugar and must be counted as carbs.  What are tips for following this plan?    Reading food labels  Start by checking the serving size on the \"Nutrition Facts\" label of packaged foods and drinks. The amount of calories, carbs, fats, and other nutrients listed on the label is based on one serving of the item. Many items contain more than one serving per package.  Check the total grams (g) of carbs in one serving. You can calculate the number of servings of carbs in one serving by dividing the total carbs by 15. For example, if a food has 30 g of total carbs per serving, it would be equal to 2 servings of carbs.  Check the number of grams (g) of saturated fats and trans fats in one serving. Choose foods that have a low amount or none of these fats.  Check the number of milligrams (mg) of salt (sodium) in one serving. Most people should limit total sodium intake to less than 2,300 mg per day.  Always check the nutrition information of foods labeled as \"low-fat\" or \"nonfat.\" These foods may be higher in added sugar or refined carbs and should be avoided.  Talk to your dietitian to identify your daily goals for nutrients listed on the label.  Shopping  Avoid buying canned, pre-made, or processed foods. These foods tend to be high in fat, sodium, and added sugar.  Shop around the outside edge of the grocery store. This " is where you will most often find fresh fruits and vegetables, bulk grains, fresh meats, and fresh dairy.  Cooking  Use low-heat cooking methods, such as baking, instead of high-heat cooking methods like deep frying.  Cook using healthy oils, such as olive, canola, or sunflower oil.  Avoid cooking with butter, cream, or high-fat meats.  Meal planning  Eat meals and snacks regularly, preferably at the same times every day. Avoid going long periods of time without eating.  Eat foods that are high in fiber, such as fresh fruits, vegetables, beans, and whole grains. Talk with your dietitian about how many servings of carbs you can eat at each meal.  Eat 4-6 oz (112-168 g) of lean protein each day, such as lean meat, chicken, fish, eggs, or tofu. One ounce (oz) of lean protein is equal to:  1 oz (28 g) of meat, chicken, or fish.  1 egg.  ¼ cup (62 g) of tofu.  Eat some foods each day that contain healthy fats, such as avocado, nuts, seeds, and fish.  What foods should I eat?  Fruits  Berries. Apples. Oranges. Peaches. Apricots. Plums. Grapes. [place]. Papaya. Pomegranate. Kiwi. Cherries.  Vegetables  Lettuce. Spinach. Leafy greens, including kale, chard, reta greens, and mustard greens. Beets. Cauliflower. Cabbage. Broccoli. Carrots. Green beans. Tomatoes. Peppers. Onions. Cucumbers. [place] sprouts.  Grains  Whole grains, such as whole-wheat or whole-grain bread, crackers, tortillas, cereal, and pasta. Unsweetened oatmeal. Quinoa. Brown or wild rice.  Meats and other proteins  Seafood. Poultry without skin. Lean cuts of poultry and beef. Tofu. Nuts. Seeds.  Dairy  Low-fat or fat-free dairy products such as milk, yogurt, and cheese.  The items listed above may not be a complete list of foods and beverages you can eat. Contact a dietitian for more information.  What foods should I avoid?  Fruits  Fruits canned with syrup.  Vegetables  Canned vegetables. Frozen vegetables with butter or cream sauce.  Grains  Refined  white flour and flour products such as bread, pasta, snack foods, and cereals. Avoid all processed foods.  Meats and other proteins  Fatty cuts of meat. Poultry with skin. Breaded or fried meats. Processed meat. Avoid saturated fats.  Dairy  Full-fat yogurt, cheese, or milk.  Beverages  Sweetened drinks, such as soda or iced tea.  The items listed above may not be a complete list of foods and beverages you should avoid. Contact a dietitian for more information.  Questions to ask a health care provider  Do I need to meet with a diabetes educator?  Do I need to meet with a dietitian?  What number can I call if I have questions?  When are the best times to check my blood glucose?  Where to find more information:  American Diabetes Association: diabetes.org  Academy of Nutrition and Dietetics: www.eatright.org  National Stites of Diabetes and Digestive and Kidney Diseases: www.niddk.nih.gov  Association of Diabetes Care and Education Specialists: www.diabeteseducator.org  Summary  It is important to have healthy eating habits because your blood sugar (glucose) levels are greatly affected by what you eat and drink.  A healthy meal plan will help you control your blood glucose and maintain a healthy lifestyle.  Your health care provider may recommend that you work with a dietitian to make a meal plan that is best for you.  Keep in mind that carbohydrates (carbs) and alcohol have immediate effects on your blood glucose levels. It is important to count carbs and to use alcohol carefully.  This information is not intended to replace advice given to you by your health care provider. Make sure you discuss any questions you have with your health care provider.  Document Revised: 11/24/2020 Document Reviewed: 11/24/2020  Elsevier Patient Education © 2021 [employer]

## 2022-04-05 NOTE — ASSESSMENT & PLAN NOTE
Diabetes is improving with lifestyle modifications.   Continue current treatment regimen.  Dietary recommendations for ADA diet.  Discussed foot care.  Reminded to get yearly retinal exam.  We will check an A1c today with her labs.  Diabetes will be reassessed in 3 months.

## 2022-04-21 ENCOUNTER — TELEMEDICINE (OUTPATIENT)
Dept: BEHAVIORAL HEALTH | Facility: CLINIC | Age: 42
End: 2022-04-21

## 2022-04-21 DIAGNOSIS — F31.30 BIPOLAR AFFECTIVE DISORDER, CURRENT EPISODE DEPRESSED, CURRENT EPISODE SEVERITY UNSPECIFIED: Primary | ICD-10-CM

## 2022-04-21 DIAGNOSIS — F43.10 POST TRAUMATIC STRESS DISORDER (PTSD): ICD-10-CM

## 2022-04-21 DIAGNOSIS — G47.00 INSOMNIA, UNSPECIFIED TYPE: ICD-10-CM

## 2022-04-21 DIAGNOSIS — F41.9 ANXIETY: ICD-10-CM

## 2022-04-21 PROCEDURE — 99214 OFFICE O/P EST MOD 30 MIN: CPT | Performed by: PHYSICIAN ASSISTANT

## 2022-04-21 RX ORDER — ARIPIPRAZOLE 2 MG/1
TABLET ORAL
Qty: 30 TABLET | Refills: 1 | Status: SHIPPED | OUTPATIENT
Start: 2022-04-21 | End: 2022-05-09

## 2022-04-21 RX ORDER — PRAZOSIN HYDROCHLORIDE 1 MG/1
1 CAPSULE ORAL NIGHTLY
Qty: 30 CAPSULE | Refills: 1 | Status: SHIPPED | OUTPATIENT
Start: 2022-04-21 | End: 2022-05-09

## 2022-04-21 RX ORDER — TRAZODONE HYDROCHLORIDE 50 MG/1
TABLET ORAL
Qty: 30 TABLET | Refills: 1 | Status: SHIPPED | OUTPATIENT
Start: 2022-04-21 | End: 2022-05-09

## 2022-04-21 NOTE — PROGRESS NOTES
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 Leukocytes 01/12/2022 Negative  Negative Final   • Nitrite, UA 01/12/2022 Negative  Negative Final   • Protein, POC 01/12/2022 Negative  Negative mg/dL Final   • Glucose, UA 01/12/2022 Negative  Negative, 1000 mg/dL (3+) mg/dL Final   • Ketones, UA 01/12/2022 Negative  Negative Final   • Urobilinogen, UA 01/12/2022 Normal  Normal Final   • Bilirubin 01/12/2022 Small (1+) (A) Negative Final   • Blood, UA 01/12/2022 Negative  Negative Final   • Lot Number 01/12/2022 [id number]   Final   • Expiration Date 01/12/2022 12/31/2022   Final   Office Visit on 12/28/2021   Component Date Value Ref Range Status   • Rapid Influenza A Ag 12/28/2021 Negative  Negative Final   • Rapid Influenza B Ag 12/28/2021 Negative  Negative Final   • Internal Control 12/28/2021 Passed  Passed Final   • Lot Number 12/28/2021 [id number]   Final   • Expiration Date 12/28/2021 4/27/2022   Final   • Rapid Strep A Screen 12/28/2021 Negative  Negative, VALID, INVALID, Not Performed Final   • Internal Control 12/28/2021 Passed  Passed Final   • Lot Number 12/28/2021 [id number]   Final   • Expiration Date 12/28/2021 3/25/2022   Final   • COVID19 12/28/2021 Not Detected  Not Detected - Ref. Range Final   Office Visit on 11/15/2021   Component Date Value Ref Range Status   • Folate 11/15/2021 6.40  4.78 -  24.20 ng/mL Final   • Vitamin B-12 11/15/2021 469  211 - 946 pg/mL Final   • Hemoglobin A1C 11/15/2021 6.60 (A) 4.80 - 5.60 % Final   • Glucose 11/15/2021 108 (A) 65 - 99 mg/dL Final   • BUN 11/15/2021 12  6 - 20 mg/dL Final   • Creatinine 11/15/2021 0.82  0.57 - 1.00 mg/dL Final   • Sodium 11/15/2021 139  136 - 145 mmol/L Final   • Potassium 11/15/2021 4.2  3.5 - 5.2 mmol/L Final   • Chloride 11/15/2021 105  98 - 107 mmol/L Final   • CO2 11/15/2021 23.7  22.0 - 29.0 mmol/L Final   • Calcium 11/15/2021 9.0  8.6 - 10.5 mg/dL Final   • Total Protein 11/15/2021 6.9  6.0 - 8.5 g/dL Final   • Albumin 11/15/2021 4.30  3.50 - 5.20 g/dL Final   • ALT (SGPT) 11/15/2021 26  1 - 33 U/L Final   • AST (SGOT) 11/15/2021 17  1 - 32 U/L Final   • Alkaline Phosphatase 11/15/2021 70  39 - 117 U/L Final   • Total Bilirubin 11/15/2021 <0.2  0.0 - 1.2 mg/dL Final   • eGFR Non  Amer 11/15/2021 76  >60 mL/min/1.73 Final   • Globulin 11/15/2021 2.6  gm/dL Final   • A/G Ratio 11/15/2021 1.7  g/dL Final   • BUN/Creatinine Ratio 11/15/2021 14.6  7.0 - 25.0 Final   • Anion Gap 11/15/2021 10.3  5.0 - 15.0 mmol/L Final   • Total Cholesterol 11/15/2021 177  0 - 200 mg/dL Final   • Triglycerides 11/15/2021 117  0 - 150 mg/dL Final   • HDL Cholesterol 11/15/2021 35 (A) 40 - 60 mg/dL Final   • LDL Cholesterol  11/15/2021 121 (A) 0 - 100 mg/dL Final   • VLDL Cholesterol 11/15/2021 21  5 - 40 mg/dL Final   • LDL/HDL Ratio 11/15/2021 3.39   Final       EKG Results:  No orders to display       Imaging Results:  No Images in the past 120 days found..      Assessment/Plan   Diagnoses and all orders for this visit:    1. Bipolar affective disorder, current episode depressed, current episode severity unspecified (HCC) (Primary)  -     Ambulatory Referral to Psychotherapy  -     ARIPiprazole (Abilify) 2 MG tablet; Take 0.5 tab PO QD x 3 days, if tolerated take 1 tab PO QD  Dispense: 30 tablet; Refill: 1    2. Post traumatic stress disorder (PTSD)  -      Ambulatory Referral to Psychotherapy  -     prazosin (MINIPRESS) 1 MG capsule; Take 1 capsule by mouth Every Night for 30 days.  Dispense: 30 capsule; Refill: 1  -     ARIPiprazole (Abilify) 2 MG tablet; Take 0.5 tab PO QD x 3 days, if tolerated take 1 tab PO QD  Dispense: 30 tablet; Refill: 1    3. Insomnia, unspecified type  -     Ambulatory Referral to Psychotherapy  -     prazosin (MINIPRESS) 1 MG capsule; Take 1 capsule by mouth Every Night for 30 days.  Dispense: 30 capsule; Refill: 1  -     ARIPiprazole (Abilify) 2 MG tablet; Take 0.5 tab PO QD x 3 days, if tolerated take 1 tab PO QD  Dispense: 30 tablet; Refill: 1  -     traZODone (DESYREL) 50 MG tablet; Take 0.5 tab PO QHS for sleep. Can take 0.5 tab PO one hour later if needed.  Dispense: 30 tablet; Refill: 1    4. Anxiety  -     Ambulatory Referral to Psychotherapy  -     ARIPiprazole (Abilify) 2 MG tablet; Take 0.5 tab PO QD x 3 days, if tolerated take 1 tab PO QD  Dispense: 30 tablet; Refill: 1    Presentation seems most consistent with bipolar disorder, anxiety, PTSD, and insomnia.  We will discontinue Lamictal as patient reports that it is not beneficial for her.  We will start the patient on Abilify for management of bipolar, PTSD, anxiety, and overall mood.  We will continue trazodone at current dose for sleep as needed.  We will start the patient on prazosin for management of PTSD nightmares.  Follow-up in 2 to 3 weeks.  Will refer her to an office therapist.  Addressed all questions and concerns.      Visit Diagnoses:    ICD-10-CM ICD-9-CM   1. Bipolar affective disorder, current episode depressed, current episode severity unspecified ([facility])  F31.30 296.50   2. Post traumatic stress disorder (PTSD)  F43.10 309.81   3. Insomnia, unspecified type  G47.00 780.52   4. Anxiety  F41.9 300.00       PLAN:  1. Safety: No acute safety concerns at this time.  2. Therapy: Will refer patient to Yesenia Miller   3. Risk Assessment: Risk of self-harm acutely  is moderate to high.  Risk factors include anxiety disorder, mood disorder, family history of daughter with suicide attempt, access to guns, h/o suicide attempt and self harm in the past, AODA, and recent psychosocial stressors (pandemic). Protective factors include no present SI, healthcare seeking, future orientation, willingness to engage in care.  Risk of self-harm chronically is also moderate to high, but could be further elevated in the event of treatment noncompliance and/or AODA.  4. Labs/Diagnostics Ordered:   Orders Placed This Encounter   Procedures   • Ambulatory Referral to Psychotherapy     5. Medications:   New Medications Ordered This Visit   Medications   • prazosin (MINIPRESS) 1 MG capsule     Sig: Take 1 capsule by mouth Every Night for 30 days.     Dispense:  30 capsule     Refill:  1   • ARIPiprazole (Abilify) 2 MG tablet     Sig: Take 0.5 tab PO QD x 3 days, if tolerated take 1 tab PO QD     Dispense:  30 tablet     Refill:  1   • traZODone (DESYREL) 50 MG tablet     Sig: Take 0.5 tab PO QHS for sleep. Can take 0.5 tab PO one hour later if needed.     Dispense:  30 tablet     Refill:  1       Discussed all risks, benefits, alternatives, and side effects of Aripiprazole, including but not limited to GI upset, headache, activating/sedating effects, dyslipidemia, extrapyramidal symptoms (dystonia, drug-induced parkinsonism, akathisia, tardive dyskinesia), lowering of seizure threshold, hematologic abnormalities, hyperglycemia, impulse control disorders, increased mortality in elderly patients with dementia-related psychosis, neuroleptic malignant syndrome, sexual dysfunction, orthostatic hypotension, falls risk in older adults, and temperature dysregulation. Pt instructed to avoid driving and doing other tasks or actions that require to be alert until knowing how the drug affects them.  Pt educated on the need to practice safe sex while taking this med. Discussed the need for pt to immediately  call the office for any new or worsening symptoms, such as worsening depression; feeling nervous or restless; suicidal thoughts or actions; or other changes changes in mood or behavior, and all other concerns. Pt educated on med compliance and the risks of suddenly stopping this medication or missing doses. Pt verbalized understanding and is agreeable to taking Aripiprazole. Addressed all questions and concerns.     Discussed all risks, benefits, alternatives, and side effects of Trazodone including but not limited to GI upset, sexual dysfunction, dizziness, headache, nervousness, bleeding risk, seizure risk, sedation, headache, activation of hong or hypomania, increased fragility fracture risk, cardiac arrhythmias, priapism, hyponatremia, ocular effects, prolonged QT interval, withdrawal syndrome following abrupt discontinuation, serotonin syndrome, and activation of suicidal ideation and behavior.  Pt educated on the need to practice safe sex while taking this med. Discussed the need for pt to immediately call the office for any new or worsening symptoms, such as worsening depression; feeling nervous or restless; suicidal thoughts or actions; or other changes changes in mood or behavior, and all other concerns. Pt educated on med compliance and the risks of suddenly stopping this medication or missing doses. Pt verbalized understanding and is agreeable to taking Trazodone. Addressed all questions and concerns.     Prazosin, Risks, benefits, alternatives, and side effects discussed with patient including sedation, dizziness/falls risk, hypotension, GI upset. After discussion of these risks and benefits, the patient voiced understanding and agreed to proceed.      6. Follow up:   F/u in 2-3 weeks.     TREATMENT PLAN/GOALS: Continue supportive psychotherapy efforts and medications as indicated. Treatment and medication options discussed during today's visit. Patient ackowledged and verbally consented to continue  with current treatment plan and was educated on the importance of compliance with treatment and follow-up appointments.    MEDICATION ISSUES:  CAMMY reviewed as expected.  Discussed medication options and treatment plan of prescribed medication as well as the risks, benefits, and side effects including potential falls, possible impaired driving and metabolic adversities among others. Patient is agreeable to call the office with any worsening of symptoms or onset of side effects. Patient is agreeable to call 911 or go to the nearest ER should he/she begin having SI/HI. No medication side effects or related complaints today.            This document has been electronically signed by Zulma Guerrero PA-C  April 21, 2022 11:45 EDT      Part of this note may be an electronic transcription/translation of spoken language to printed text using the Dragon Dictation System.
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
## 2022-05-06 ENCOUNTER — TELEMEDICINE (OUTPATIENT)
Dept: PSYCHIATRY | Facility: CLINIC | Age: 42
End: 2022-05-06

## 2022-05-06 DIAGNOSIS — F41.1 GENERALIZED ANXIETY DISORDER: Primary | ICD-10-CM

## 2022-05-06 PROCEDURE — 90837 PSYTX W PT 60 MINUTES: CPT | Performed by: SOCIAL WORKER

## 2022-05-06 NOTE — PATIENT INSTRUCTIONS
Grounding Techniques    After a trauma, it’s normal to experience flashbacks, anxiety, and other uncomfortable   symptoms. Grounding techniques help control these symptoms by turning attention away from thoughts, memories, or worries, and refocusing on the present moment.    5-4-3-2-1 Technique  Using the 5-4-3-2-1 technique, you will purposefully take in the details of your surroundings   using each of your senses. Strive to notice small details that your mind would usually tune out,   such as distant sounds, or the texture of an ordinary object.  What are 5 things you can see? Look for small details such as a pattern on the   ceiling, the way light reflects off a surface, or an object you never noticed.  What are 4 things you can feel? Notice the sensation of clothing on your body, the   sun on your skin, or the feeling of the chair you are sitting in.  an object and   examine its weight, texture, and other physical qualities.  What are 3 things you can hear? Pay special attention to the sounds your mind   has tuned out, such as a ticking clock, distant traffic, or trees blowing in the wind.  What are 2 things you can smell? Try to notice smells in the air around you, like an   air freshener or freshly mowed grass. You may also look around for something   that has a scent, such as a flower or an unlit candle.  What is 1 thing you can taste? Carry gum, candy, or small snacks for this step. Pop   one in your mouth and focus your attention closely on the flavors.    Categories  Choose at least three of the categories below and name as many items as you can in each one.   Spend a few minutes on each category to come up with as many items as possible.  Movies Countries Books Cereals  Sports Teams Colors Cars Fruits & Vegetables  Animals Cities TV Shows Famous People  For a variation on this activity, try naming items in a category alphabetically. For example, for the   fruits & vegetables category, say “apple,  banana, carrot,” and so on.    Body Awareness  The body awareness technique will bring you into the here-and-now by directing your focus to   sensations in the body. Pay special attention to the physical sensations created by each step.  1. Take 5 long, deep breaths through your nose, and exhale through puckered lips.  2. Place both feet flat on the floor. Wiggle your toes. Curl and uncurl your toes several   times. Spend a moment noticing the sensations in your feet.  3. Stomp your feet on the ground several times. Pay attention to the sensations in your feet   and legs as you make contact with the ground.  4. Clench your hands into fists, then release the tension. Repeat this 10 times.   5. Press your palms together. Press them harder and hold this pose for 15 seconds. Pay   attention to the feeling of tension in your hands and arms.  6. Rub your palms together briskly. Notice and sound and the feeling of warmth.  7. Reach your hands over your head like you’re trying to reach the anh. Stretch like this for 5   seconds. Bring your arms down and let them relax at your sides.  8. Take 5 more deep breaths and notice the feeling of calm in your body.    Mental Exercises  Use mental exercises to take your mind off uncomfortable thoughts and feelings. They are   discreet and easy to use at nearly any time or place. [place] to see which work best for you.   Name all the objects you see.   Describe the steps in performing an activity you know how to do well. For example,   how to shoot a basketball, prepare your favorite meal, or tie a knot.   Count backwards from 100 by 7.    an object and describe it in detail. Describe its color, texture, size, weight,   scent, and any other qualities you notice.   Spell your full name, and the names of three other people, backwards.   Name all your family members, their ages, and one of their favorite activities.   Read something backwards, letter-by-letter. Practice for at  least a few minutes.   Think of an object and “draw” it in your mind, or in the air with your finger. Try drawing   your home, a vehicle, or an animal.

## 2022-05-09 ENCOUNTER — TELEMEDICINE (OUTPATIENT)
Dept: BEHAVIORAL HEALTH | Facility: CLINIC | Age: 42
End: 2022-05-09

## 2022-05-09 DIAGNOSIS — F31.30 BIPOLAR AFFECTIVE DISORDER, CURRENT EPISODE DEPRESSED, CURRENT EPISODE SEVERITY UNSPECIFIED: Primary | ICD-10-CM

## 2022-05-09 DIAGNOSIS — G47.00 INSOMNIA, UNSPECIFIED TYPE: ICD-10-CM

## 2022-05-09 DIAGNOSIS — F40.10 SOCIAL ANXIETY DISORDER: ICD-10-CM

## 2022-05-09 DIAGNOSIS — F43.10 POST TRAUMATIC STRESS DISORDER (PTSD): ICD-10-CM

## 2022-05-09 PROCEDURE — 99214 OFFICE O/P EST MOD 30 MIN: CPT | Performed by: PHYSICIAN ASSISTANT

## 2022-05-09 RX ORDER — TRAZODONE HYDROCHLORIDE 50 MG/1
TABLET ORAL
Qty: 30 TABLET | Refills: 2 | Status: SHIPPED | OUTPATIENT
Start: 2022-05-09 | End: 2022-06-06 | Stop reason: SDUPTHER

## 2022-05-09 RX ORDER — GABAPENTIN 300 MG/1
CAPSULE ORAL
Qty: 90 CAPSULE | Refills: 0 | Status: SHIPPED | OUTPATIENT
Start: 2022-05-09 | End: 2022-06-06 | Stop reason: SDUPTHER

## 2022-05-09 NOTE — PROGRESS NOTES
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  0.00 - 0.20 10*3/mm3 Final   • Immature Grans, Absolute 01/17/2022 0.05  0.00 - 0.05 10*3/mm3 Final   • nRBC 01/17/2022 0.0  0.0 - 0.2 /100 WBC Final   • Extra Tube 01/17/2022 Hold for add-ons.   Final    Auto resulted.   • Extra Tube 01/17/2022 hold for add-on   Final    Auto resulted   • Extra Tube 01/17/2022 Hold for add-ons.   Final    Auto resulted.   • Extra Tube 01/17/2022 hold for add-on   Final    Auto resulted   • Extra Tube 01/17/2022 Hold for add-ons.   Final    Auto resulted.   • Extra Tube 01/17/2022 Hold for add-ons.   Final    Auto resulted.   • Blood Culture 01/17/2022 No growth at 5 days   Final   • Blood Culture 01/17/2022 No growth at 5 days   Final   Office Visit on 01/12/2022   Component Date Value Ref Range Status   • Color 01/12/2022 Yellow  Yellow, Straw, Dark Yellow, Ruth Final   • Clarity, UA 01/12/2022 Clear  Clear Final   • Specific Gravity  01/12/2022 1.030  1.005 - 1.030 Final   • pH, Urine 01/12/2022 5.0  5.0 - 8.0 Final   • Leukocytes 01/12/2022 Negative  Negative Final   • Nitrite, UA 01/12/2022 Negative  Negative Final   • Protein, POC 01/12/2022 Negative  Negative mg/dL Final   • Glucose, UA 01/12/2022 Negative  Negative, 1000 mg/dL (3+) mg/dL Final   •  Ketones, UA 01/12/2022 Negative  Negative Final   • Urobilinogen, UA 01/12/2022 Normal  Normal Final   • Bilirubin 01/12/2022 Small (1+) (A) Negative Final   • Blood, UA 01/12/2022 Negative  Negative Final   • Lot Number 01/12/2022 [id number]   Final   • Expiration Date 01/12/2022 12/31/2022   Final   Office Visit on 12/28/2021   Component Date Value Ref Range Status   • Rapid Influenza A Ag 12/28/2021 Negative  Negative Final   • Rapid Influenza B Ag 12/28/2021 Negative  Negative Final   • Internal Control 12/28/2021 Passed  Passed Final   • Lot Number 12/28/2021 [id number]   Final   • Expiration Date 12/28/2021 4/27/2022   Final   • Rapid Strep A Screen 12/28/2021 Negative  Negative, VALID, INVALID, Not Performed Final   • Internal Control 12/28/2021 Passed  Passed Final   • Lot Number 12/28/2021 [id number]   Final   • Expiration Date 12/28/2021 3/25/2022   Final   • COVID19 12/28/2021 Not Detected  Not Detected - Ref. Range Final   Office Visit on 11/15/2021   Component Date Value Ref Range Status   • Folate 11/15/2021 6.40  4.78 - 24.20 ng/mL Final   • Vitamin B-12 11/15/2021 469  211 - 946 pg/mL Final   • Hemoglobin A1C 11/15/2021 6.60 (A) 4.80 - 5.60 % Final   • Glucose 11/15/2021 108 (A) 65 - 99 mg/dL Final   • BUN 11/15/2021 12  6 - 20 mg/dL Final   • Creatinine 11/15/2021 0.82  0.57 - 1.00 mg/dL Final   • Sodium 11/15/2021 139  136 - 145 mmol/L Final   • Potassium 11/15/2021 4.2  3.5 - 5.2 mmol/L Final   • Chloride 11/15/2021 105  98 - 107 mmol/L Final   • CO2 11/15/2021 23.7  22.0 - 29.0 mmol/L Final   • Calcium 11/15/2021 9.0  8.6 - 10.5 mg/dL Final   • Total Protein 11/15/2021 6.9  6.0 - 8.5 g/dL Final   • Albumin 11/15/2021 4.30  3.50 - 5.20 g/dL Final   • ALT (SGPT) 11/15/2021 26  1 - 33 U/L Final   • AST (SGOT) 11/15/2021 17  1 - 32 U/L Final   • Alkaline Phosphatase 11/15/2021 70  39 - 117 U/L Final   • Total Bilirubin 11/15/2021 <0.2  0.0 - 1.2 mg/dL Final   • eGFR Non  Amer 11/15/2021 76  >60  mL/min/1.73 Final   • Globulin 11/15/2021 2.6  gm/dL Final   • A/G Ratio 11/15/2021 1.7  g/dL Final   • BUN/Creatinine Ratio 11/15/2021 14.6  7.0 - 25.0 Final   • Anion Gap 11/15/2021 10.3  5.0 - 15.0 mmol/L Final   • Total Cholesterol 11/15/2021 177  0 - 200 mg/dL Final   • Triglycerides 11/15/2021 117  0 - 150 mg/dL Final   • HDL Cholesterol 11/15/2021 35 (A) 40 - 60 mg/dL Final   • LDL Cholesterol  11/15/2021 121 (A) 0 - 100 mg/dL Final   • VLDL Cholesterol 11/15/2021 21  5 - 40 mg/dL Final   • LDL/HDL Ratio 11/15/2021 3.39   Final       EKG Results:  No orders to display       Imaging Results:  No Images in the past 120 days found..      Assessment/Plan   Diagnoses and all orders for this visit:    1. Bipolar affective disorder, current episode depressed, current episode severity unspecified (HCC) (Primary)  -     gabapentin (Neurontin) 300 MG capsule; Take one cap PO QHS x 3 days, if tolerated take 1 cap PO BID x 3 days, if tolerated take 1 cap PO TID  Dispense: 90 capsule; Refill: 0    2. Post traumatic stress disorder (PTSD)  -     gabapentin (Neurontin) 300 MG capsule; Take one cap PO QHS x 3 days, if tolerated take 1 cap PO BID x 3 days, if tolerated take 1 cap PO TID  Dispense: 90 capsule; Refill: 0    3. Insomnia, unspecified type  -     gabapentin (Neurontin) 300 MG capsule; Take one cap PO QHS x 3 days, if tolerated take 1 cap PO BID x 3 days, if tolerated take 1 cap PO TID  Dispense: 90 capsule; Refill: 0  -     traZODone (DESYREL) 50 MG tablet; Take 1-2 tab PO QHS for sleep  Dispense: 30 tablet; Refill: 2    4. Social anxiety disorder  -     gabapentin (Neurontin) 300 MG capsule; Take one cap PO QHS x 3 days, if tolerated take 1 cap PO BID x 3 days, if tolerated take 1 cap PO TID  Dispense: 90 capsule; Refill: 0    Presentation seems most consistent with bipolar disorder, anxiety, PTSD, social anxiety, and insomnia.  We will discontinue Abilify and prazosin due to reported side effects.  We will start  patient on gabapentin and use as a mood stabilizer and to target bipolar, PTSD, social anxiety, insomnia, and overall mood.  Patient will likely benefit from this medication due to reported chronic pain.  We will increase trazodone for sleep as needed.  Follow-up in 1 month.  Continue therapy.  Addressed all questions and concerns.    Visit Diagnoses:    ICD-10-CM ICD-9-CM   1. Bipolar affective disorder, current episode depressed, current episode severity unspecified (HCC)  F31.30 296.50   2. Post traumatic stress disorder (PTSD)  F43.10 309.81   3. Insomnia, unspecified type  G47.00 780.52   4. Social anxiety disorder  F40.10 300.23       PLAN:  1. Safety: No acute safety concerns at this time.  2. Therapy: Continue therapy with Yesenia Ling LCSW  3. Risk Assessment: Risk of self-harm acutely is moderate to high.  Risk factors include anxiety disorder, mood disorder, family history of daughter with suicide attempt, access to guns, h/o suicide attempt and self harm in the past, AODA, and recent psychosocial stressors (pandemic). Protective factors include no present SI, healthcare seeking, future orientation, willingness to engage in care.  Risk of self-harm chronically is also moderate to high, but could be further elevated in the event of treatment noncompliance and/or AODA.  4. Labs/Diagnostics Ordered:   No orders of the defined types were placed in this encounter.    5. Medications:   New Medications Ordered This Visit   Medications   • gabapentin (Neurontin) 300 MG capsule     Sig: Take one cap PO QHS x 3 days, if tolerated take 1 cap PO BID x 3 days, if tolerated take 1 cap PO TID     Dispense:  90 capsule     Refill:  0   • traZODone (DESYREL) 50 MG tablet     Sig: Take 1-2 tab PO QHS for sleep     Dispense:  30 tablet     Refill:  2     Discussed all risks, benefits, alternatives, and side effects of Trazodone including but not limited to GI upset, sexual dysfunction, dizziness, headache, nervousness,  bleeding risk, seizure risk, sedation, headache, activation of hong or hypomania, increased fragility fracture risk, cardiac arrhythmias, priapism, hyponatremia, ocular effects, prolonged QT interval, withdrawal syndrome following abrupt discontinuation, serotonin syndrome, and activation of suicidal ideation and behavior.  Pt educated on the need to practice safe sex while taking this med. Discussed the need for pt to immediately call the office for any new or worsening symptoms, such as worsening depression; feeling nervous or restless; suicidal thoughts or actions; or other changes changes in mood or behavior, and all other concerns. Pt educated on med compliance and the risks of suddenly stopping this medication or missing doses. Pt verbalized understanding and is agreeable to taking Trazodone. Addressed all questions and concerns.     Discussed all risks, benefits, alternatives, and side effects of Gabapentin including but not limited to sedation, dizziness, GI upset, dry mouth, and weight gain. Pt instructed to avoid driving and doing other tasks or actions that require to be alert until knowing how the drug affects them.  Pt educated on the need to practice safe sex while taking this med. Discussed the need for pt to immediately call the office for any new or worsening symptoms, such as worsening depression; feeling nervous or restless; suicidal thoughts or actions; or other changes changes in mood or behavior, and all other concerns. Pt educated on med compliance and the risks of suddenly stopping this medication or missing doses. Pt verbalized understanding and is agreeable to taking Gabapentin. Addressed all questions and concerns.  Will order UDS and obtain CAMMY report. Pt verbally signed controlled substances agreement.      6. Follow up:   F/u in 1 month.    TREATMENT PLAN/GOALS: Continue supportive psychotherapy efforts and medications as indicated. Treatment and medication options discussed during  today's visit. Patient ackowledged and verbally consented to continue with current treatment plan and was educated on the importance of compliance with treatment and follow-up appointments.    MEDICATION ISSUES:  CAMMY reviewed as expected.  Discussed medication options and treatment plan of prescribed medication as well as the risks, benefits, and side effects including potential falls, possible impaired driving and metabolic adversities among others. Patient is agreeable to call the office with any worsening of symptoms or onset of side effects. Patient is agreeable to call 911 or go to the nearest ER should he/she begin having SI/HI. No medication side effects or related complaints today.            This document has been electronically signed by Zulma Guerrero PA-C  May 9, 2022 08:52 EDT      Part of this note may be an electronic transcription/translation of spoken language to printed text using the Dragon Dictation System.

## 2022-06-06 ENCOUNTER — TELEMEDICINE (OUTPATIENT)
Dept: BEHAVIORAL HEALTH | Facility: CLINIC | Age: 42
End: 2022-06-06

## 2022-06-06 DIAGNOSIS — F31.30 BIPOLAR AFFECTIVE DISORDER, CURRENT EPISODE DEPRESSED, CURRENT EPISODE SEVERITY UNSPECIFIED: Primary | ICD-10-CM

## 2022-06-06 DIAGNOSIS — F43.10 POST TRAUMATIC STRESS DISORDER (PTSD): ICD-10-CM

## 2022-06-06 DIAGNOSIS — G47.00 INSOMNIA, UNSPECIFIED TYPE: ICD-10-CM

## 2022-06-06 DIAGNOSIS — F40.10 SOCIAL ANXIETY DISORDER: ICD-10-CM

## 2022-06-06 PROCEDURE — 99213 OFFICE O/P EST LOW 20 MIN: CPT | Performed by: PHYSICIAN ASSISTANT

## 2022-06-06 RX ORDER — TRAZODONE HYDROCHLORIDE 50 MG/1
TABLET ORAL
Qty: 30 TABLET | Refills: 2 | Status: SHIPPED | OUTPATIENT
Start: 2022-06-06 | End: 2022-07-18 | Stop reason: SDUPTHER

## 2022-06-06 RX ORDER — GABAPENTIN 300 MG/1
300 CAPSULE ORAL 2 TIMES DAILY
Qty: 60 CAPSULE | Refills: 1 | Status: SHIPPED | OUTPATIENT
Start: 2022-06-06 | End: 2022-07-18

## 2022-06-06 NOTE — PROGRESS NOTES
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 UA 01/12/2022 Negative  Negative Final   • Protein, POC 01/12/2022 Negative  Negative mg/dL Final   • Glucose, UA 01/12/2022 Negative  Negative, 1000 mg/dL (3+) mg/dL Final   • Ketones, UA 01/12/2022 Negative  Negative Final   • Urobilinogen, UA 01/12/2022 Normal  Normal Final   • Bilirubin 01/12/2022 Small (1+) (A) Negative Final   • Blood, UA 01/12/2022 Negative  Negative Final   • Lot Number 01/12/2022 [id number]   Final   • Expiration Date 01/12/2022 12/31/2022   Final   Office Visit on 12/28/2021   Component Date Value Ref Range Status   • Rapid Influenza A Ag 12/28/2021 Negative  Negative Final   • Rapid Influenza B Ag 12/28/2021 Negative  Negative Final   • Internal Control 12/28/2021 Passed  Passed Final   • Lot Number 12/28/2021 [id number]   Final   • Expiration Date 12/28/2021 4/27/2022   Final   • Rapid Strep A Screen 12/28/2021 Negative  Negative, VALID, INVALID, Not Performed Final   • Internal Control 12/28/2021 Passed  Passed Final   • Lot Number 12/28/2021 [id number]   Final   • Expiration Date 12/28/2021 3/25/2022   Final   • COVID19 12/28/2021 Not Detected  Not Detected - Ref. Range Final   Office Visit on 11/15/2021   Component Date Value Ref Range Status   • Folate 11/15/2021 6.40  4.78 - 24.20 ng/mL Final   • Vitamin B-12 11/15/2021 469  211 - 946 pg/mL Final   • Hemoglobin  A1C 11/15/2021 6.60 (A) 4.80 - 5.60 % Final   • Glucose 11/15/2021 108 (A) 65 - 99 mg/dL Final   • BUN 11/15/2021 12  6 - 20 mg/dL Final   • Creatinine 11/15/2021 0.82  0.57 - 1.00 mg/dL Final   • Sodium 11/15/2021 139  136 - 145 mmol/L Final   • Potassium 11/15/2021 4.2  3.5 - 5.2 mmol/L Final   • Chloride 11/15/2021 105  98 - 107 mmol/L Final   • CO2 11/15/2021 23.7  22.0 - 29.0 mmol/L Final   • Calcium 11/15/2021 9.0  8.6 - 10.5 mg/dL Final   • Total Protein 11/15/2021 6.9  6.0 - 8.5 g/dL Final   • Albumin 11/15/2021 4.30  3.50 - 5.20 g/dL Final   • ALT (SGPT) 11/15/2021 26  1 - 33 U/L Final   • AST (SGOT) 11/15/2021 17  1 - 32 U/L Final   • Alkaline Phosphatase 11/15/2021 70  39 - 117 U/L Final   • Total Bilirubin 11/15/2021 <0.2  0.0 - 1.2 mg/dL Final   • eGFR Non  Amer 11/15/2021 76  >60 mL/min/1.73 Final   • Globulin 11/15/2021 2.6  gm/dL Final   • A/G Ratio 11/15/2021 1.7  g/dL Final   • BUN/Creatinine Ratio 11/15/2021 14.6  7.0 - 25.0 Final   • Anion Gap 11/15/2021 10.3  5.0 - 15.0 mmol/L Final   • Total Cholesterol 11/15/2021 177  0 - 200 mg/dL Final   • Triglycerides 11/15/2021 117  0 - 150 mg/dL Final   • HDL Cholesterol 11/15/2021 35 (A) 40 - 60 mg/dL Final   • LDL Cholesterol  11/15/2021 121 (A) 0 - 100 mg/dL Final   • VLDL Cholesterol 11/15/2021 21  5 - 40 mg/dL Final   • LDL/HDL Ratio 11/15/2021 3.39   Final       EKG Results:  No orders to display       Imaging Results:  No Images in the past 120 days found..      Assessment & Plan   Diagnoses and all orders for this visit:    1. Bipolar affective disorder, current episode depressed, current episode severity unspecified (HCC) (Primary)  -     gabapentin (Neurontin) 300 MG capsule; Take 1 capsule by mouth 2 (Two) Times a Day for 30 days.  Dispense: 60 capsule; Refill: 1    2. Post traumatic stress disorder (PTSD)  -     gabapentin (Neurontin) 300 MG capsule; Take 1 capsule by mouth 2 (Two) Times a Day for 30 days.  Dispense: 60 capsule;  Refill: 1    3. Social anxiety disorder  -     gabapentin (Neurontin) 300 MG capsule; Take 1 capsule by mouth 2 (Two) Times a Day for 30 days.  Dispense: 60 capsule; Refill: 1    4. Insomnia, unspecified type  -     traZODone (DESYREL) 50 MG tablet; Take 1-2 tab PO QHS for sleep  Dispense: 30 tablet; Refill: 2  -     gabapentin (Neurontin) 300 MG capsule; Take 1 capsule by mouth 2 (Two) Times a Day for 30 days.  Dispense: 60 capsule; Refill: 1    Presentation seems most consistent with bipolar disorder, anxiety, PTSD, social anxiety, and insomnia. Discussed switching gabapentin to Lyrica due to fatigue. Pt declines changing meds at this time and would like to continue current meds with no change and reassess in one month. Will continue gabapentin at current dose for managing bipolar, PTSD, social anxiety, insomnia, and overall mood.  Will continue trazodone for sleep as needed.  Follow-up in 1 month.  Continue therapy.  Addressed all questions and concerns.    Visit Diagnoses:    ICD-10-CM ICD-9-CM   1. Bipolar affective disorder, current episode depressed, current episode severity unspecified (HCC)  F31.30 296.50   2. Post traumatic stress disorder (PTSD)  F43.10 309.81   3. Social anxiety disorder  F40.10 300.23   4. Insomnia, unspecified type  G47.00 780.52       PLAN:  1. Safety: No acute safety concerns at this time.  2. Therapy: Continue therapy with Yesenia Ling LCSW  3. Risk Assessment: Risk of self-harm acutely is moderate to high.  Risk factors include anxiety disorder, mood disorder, family history of daughter with suicide attempt, access to guns, h/o suicide attempt and self harm in the past, AODA, and recent psychosocial stressors (pandemic). Protective factors include no present SI, healthcare seeking, future orientation, willingness to engage in care.  Risk of self-harm chronically is also moderate to high, but could be further elevated in the event of treatment noncompliance and/or  AODA.  4. Labs/Diagnostics Ordered:   No orders of the defined types were placed in this encounter.    5. Medications:   New Medications Ordered This Visit   Medications   • traZODone (DESYREL) 50 MG tablet     Sig: Take 1-2 tab PO QHS for sleep     Dispense:  30 tablet     Refill:  2   • gabapentin (Neurontin) 300 MG capsule     Sig: Take 1 capsule by mouth 2 (Two) Times a Day for 30 days.     Dispense:  60 capsule     Refill:  1     Discussed all risks, benefits, alternatives, and side effects of Trazodone including but not limited to GI upset, sexual dysfunction, dizziness, headache, nervousness, bleeding risk, seizure risk, sedation, headache, activation of hong or hypomania, increased fragility fracture risk, cardiac arrhythmias, priapism, hyponatremia, ocular effects, prolonged QT interval, withdrawal syndrome following abrupt discontinuation, serotonin syndrome, and activation of suicidal ideation and behavior.  Pt educated on the need to practice safe sex while taking this med. Discussed the need for pt to immediately call the office for any new or worsening symptoms, such as worsening depression; feeling nervous or restless; suicidal thoughts or actions; or other changes changes in mood or behavior, and all other concerns. Pt educated on med compliance and the risks of suddenly stopping this medication or missing doses. Pt verbalized understanding and is agreeable to taking Trazodone. Addressed all questions and concerns.     Discussed all risks, benefits, alternatives, and side effects of Gabapentin including but not limited to sedation, dizziness, GI upset, dry mouth, and weight gain. Pt instructed to avoid driving and doing other tasks or actions that require to be alert until knowing how the drug affects them.  Pt educated on the need to practice safe sex while taking this med. Discussed the need for pt to immediately call the office for any new or worsening symptoms, such as worsening depression;  feeling nervous or restless; suicidal thoughts or actions; or other changes changes in mood or behavior, and all other concerns. Pt educated on med compliance and the risks of suddenly stopping this medication or missing doses. Pt verbalized understanding and is agreeable to taking Gabapentin. Addressed all questions and concerns.  Will order UDS and obtain CAMMY report. Pt verbally signed controlled substances agreement.      6. Follow up:   F/u in 1 month.    TREATMENT PLAN/GOALS: Continue supportive psychotherapy efforts and medications as indicated. Treatment and medication options discussed during today's visit. Patient ackowledged and verbally consented to continue with current treatment plan and was educated on the importance of compliance with treatment and follow-up appointments.    MEDICATION ISSUES:  CAMMY reviewed as expected.  Discussed medication options and treatment plan of prescribed medication as well as the risks, benefits, and side effects including potential falls, possible impaired driving and metabolic adversities among others. Patient is agreeable to call the office with any worsening of symptoms or onset of side effects. Patient is agreeable to call 911 or go to the nearest ER should he/she begin having SI/HI. No medication side effects or related complaints today.            This document has been electronically signed by Zulma Guerrero PA-C  June 6, 2022 09:39 EDT      Part of this note may be an electronic transcription/translation of spoken language to printed text using the Dragon Dictation System.

## 2022-06-09 ENCOUNTER — TELEPHONE (OUTPATIENT)
Dept: FAMILY MEDICINE CLINIC | Facility: CLINIC | Age: 42
End: 2022-06-09

## 2022-06-09 RX ORDER — SYRINGE W-NEEDLE,DISPOSAB,3 ML 25GX5/8"
1 SYRINGE, EMPTY DISPOSABLE MISCELLANEOUS
Qty: 3 EACH | Refills: 0 | Status: SHIPPED | OUTPATIENT
Start: 2022-06-09 | End: 2022-06-09 | Stop reason: SDUPTHER

## 2022-06-09 RX ORDER — METOPROLOL TARTRATE 100 MG/1
100 TABLET ORAL DAILY
Qty: 90 TABLET | Refills: 1 | Status: SHIPPED | OUTPATIENT
Start: 2022-06-09 | End: 2022-06-09

## 2022-06-09 RX ORDER — METOPROLOL TARTRATE 100 MG/1
100 TABLET ORAL DAILY
Qty: 90 TABLET | Refills: 0 | Status: SHIPPED | OUTPATIENT
Start: 2022-06-09 | End: 2023-03-17 | Stop reason: SDUPTHER

## 2022-06-09 RX ORDER — METOPROLOL TARTRATE 100 MG/1
100 TABLET ORAL DAILY
Qty: 90 TABLET | Refills: 0 | Status: SHIPPED | OUTPATIENT
Start: 2022-06-09 | End: 2022-06-09 | Stop reason: SDUPTHER

## 2022-06-09 RX ORDER — SYRINGE W-NEEDLE,DISPOSAB,3 ML 25GX5/8"
1 SYRINGE, EMPTY DISPOSABLE MISCELLANEOUS
Qty: 3 EACH | Refills: 3 | Status: SHIPPED | OUTPATIENT
Start: 2022-06-09 | End: 2022-06-09

## 2022-06-09 RX ORDER — SYRINGE W-NEEDLE,DISPOSAB,3 ML 25GX5/8"
1 SYRINGE, EMPTY DISPOSABLE MISCELLANEOUS
Qty: 3 EACH | Refills: 0 | Status: SHIPPED | OUTPATIENT
Start: 2022-06-09 | End: 2023-03-01

## 2022-07-06 ENCOUNTER — OFFICE VISIT (OUTPATIENT)
Dept: FAMILY MEDICINE CLINIC | Facility: CLINIC | Age: 42
End: 2022-07-06

## 2022-07-06 VITALS
BODY MASS INDEX: 35.9 KG/M2 | WEIGHT: 223.4 LBS | OXYGEN SATURATION: 97 % | DIASTOLIC BLOOD PRESSURE: 66 MMHG | TEMPERATURE: 97 F | HEIGHT: 66 IN | SYSTOLIC BLOOD PRESSURE: 132 MMHG | HEART RATE: 70 BPM

## 2022-07-06 DIAGNOSIS — E11.9 CONTROLLED TYPE 2 DIABETES MELLITUS WITHOUT COMPLICATION, WITHOUT LONG-TERM CURRENT USE OF INSULIN: Primary | ICD-10-CM

## 2022-07-06 DIAGNOSIS — I10 PRIMARY HYPERTENSION: ICD-10-CM

## 2022-07-06 LAB
25(OH)D3 SERPL-MCNC: 27.4 NG/ML (ref 30–100)
ALBUMIN SERPL-MCNC: 4.1 G/DL (ref 3.5–5.2)
ALBUMIN/GLOB SERPL: 1.6 G/DL
ALP SERPL-CCNC: 72 U/L (ref 39–117)
ALT SERPL W P-5'-P-CCNC: 14 U/L (ref 1–33)
ANION GAP SERPL CALCULATED.3IONS-SCNC: 10.8 MMOL/L (ref 5–15)
AST SERPL-CCNC: 13 U/L (ref 1–32)
BASOPHILS # BLD AUTO: 0.09 10*3/MM3 (ref 0–0.2)
BASOPHILS NFR BLD AUTO: 1.1 % (ref 0–1.5)
BILIRUB SERPL-MCNC: <0.2 MG/DL (ref 0–1.2)
BUN SERPL-MCNC: 11 MG/DL (ref 6–20)
BUN/CREAT SERPL: 12.9 (ref 7–25)
CALCIUM SPEC-SCNC: 9.4 MG/DL (ref 8.6–10.5)
CHLORIDE SERPL-SCNC: 107 MMOL/L (ref 98–107)
CO2 SERPL-SCNC: 23.2 MMOL/L (ref 22–29)
CREAT SERPL-MCNC: 0.85 MG/DL (ref 0.57–1)
DEPRECATED RDW RBC AUTO: 43.1 FL (ref 37–54)
EGFRCR SERPLBLD CKD-EPI 2021: 87.3 ML/MIN/1.73
EOSINOPHIL # BLD AUTO: 0.31 10*3/MM3 (ref 0–0.4)
EOSINOPHIL NFR BLD AUTO: 3.9 % (ref 0.3–6.2)
ERYTHROCYTE [DISTWIDTH] IN BLOOD BY AUTOMATED COUNT: 12.7 % (ref 12.3–15.4)
GLOBULIN UR ELPH-MCNC: 2.5 GM/DL
GLUCOSE SERPL-MCNC: 87 MG/DL (ref 65–99)
HBA1C MFR BLD: 5.5 % (ref 4.8–5.6)
HCT VFR BLD AUTO: 46.4 % (ref 34–46.6)
HGB BLD-MCNC: 15.1 G/DL (ref 12–15.9)
IMM GRANULOCYTES # BLD AUTO: 0.03 10*3/MM3 (ref 0–0.05)
IMM GRANULOCYTES NFR BLD AUTO: 0.4 % (ref 0–0.5)
LYMPHOCYTES # BLD AUTO: 2.5 10*3/MM3 (ref 0.7–3.1)
LYMPHOCYTES NFR BLD AUTO: 31.4 % (ref 19.6–45.3)
MCH RBC QN AUTO: 30.4 PG (ref 26.6–33)
MCHC RBC AUTO-ENTMCNC: 32.5 G/DL (ref 31.5–35.7)
MCV RBC AUTO: 93.4 FL (ref 79–97)
MONOCYTES # BLD AUTO: 0.42 10*3/MM3 (ref 0.1–0.9)
MONOCYTES NFR BLD AUTO: 5.3 % (ref 5–12)
NEUTROPHILS NFR BLD AUTO: 4.6 10*3/MM3 (ref 1.7–7)
NEUTROPHILS NFR BLD AUTO: 57.9 % (ref 42.7–76)
NRBC BLD AUTO-RTO: 0 /100 WBC (ref 0–0.2)
PLATELET # BLD AUTO: 305 10*3/MM3 (ref 140–450)
PMV BLD AUTO: 10.6 FL (ref 6–12)
POTASSIUM SERPL-SCNC: 4.4 MMOL/L (ref 3.5–5.2)
PROT SERPL-MCNC: 6.6 G/DL (ref 6–8.5)
RBC # BLD AUTO: 4.97 10*6/MM3 (ref 3.77–5.28)
SODIUM SERPL-SCNC: 141 MMOL/L (ref 136–145)
WBC NRBC COR # BLD: 7.95 10*3/MM3 (ref 3.4–10.8)

## 2022-07-06 PROCEDURE — 82306 VITAMIN D 25 HYDROXY: CPT

## 2022-07-06 PROCEDURE — 99214 OFFICE O/P EST MOD 30 MIN: CPT

## 2022-07-06 PROCEDURE — 83036 HEMOGLOBIN GLYCOSYLATED A1C: CPT

## 2022-07-06 PROCEDURE — 36415 COLL VENOUS BLD VENIPUNCTURE: CPT

## 2022-07-06 PROCEDURE — 85025 COMPLETE CBC W/AUTO DIFF WBC: CPT

## 2022-07-06 PROCEDURE — 80053 COMPREHEN METABOLIC PANEL: CPT

## 2022-07-06 RX ORDER — SPIRONOLACTONE 25 MG/1
25 TABLET ORAL DAILY
Qty: 30 TABLET | Refills: 2 | Status: SHIPPED | OUTPATIENT
Start: 2022-07-06 | End: 2022-12-15

## 2022-07-06 NOTE — ASSESSMENT & PLAN NOTE
Diabetes is improving with lifestyle modifications.   Dietary recommendations for ADA diet.  Regular aerobic exercise.  Diabetes will be reassessed in 3 months.

## 2022-07-06 NOTE — ASSESSMENT & PLAN NOTE
Hypertension is improving with lifestyle modifications.  Continue current treatment regimen.  Dietary sodium restriction.  Weight loss.  Regular aerobic exercise.  Stop smoking.  Continue current medications.  Blood pressure will be reassessed in 3 months.
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
## 2022-07-08 ENCOUNTER — PATIENT ROUNDING (BHMG ONLY) (OUTPATIENT)
Dept: FAMILY MEDICINE CLINIC | Facility: CLINIC | Age: 42
End: 2022-07-08

## 2022-07-08 DIAGNOSIS — E55.9 VITAMIN D INSUFFICIENCY: Primary | ICD-10-CM

## 2022-07-08 RX ORDER — CHOLECALCIFEROL (VITAMIN D3) 50 MCG
2000 TABLET ORAL DAILY
Qty: 90 TABLET | Refills: 2 | Status: SHIPPED | OUTPATIENT
Start: 2022-07-08 | End: 2023-07-08

## 2022-07-08 NOTE — PROGRESS NOTES
July 8, 2022    Hello, may I speak with Nadiya Rodríguez?    My name is Zac Elizondo    I am  with [facility]  [address]  [address]  [place] [address]  [phone].    Before we get started may I verify your date of birth? 1980    I am calling to officially welcome you to our practice and ask about your recent visit. Is this a good time to talk? yes    Tell me about your visit with us. What things went well?  The visit was great provider was wonderful. I think we will get along well        We're always looking for ways to make our patients' experiences even better. Do you have recommendations on ways we may improve?  no    Overall were you satisfied with your first visit to our practice? yes       I appreciate you taking the time to speak with me today. Is there anything else I can do for you? no      Thank you, and have a great day.

## 2022-07-18 ENCOUNTER — TELEMEDICINE (OUTPATIENT)
Dept: BEHAVIORAL HEALTH | Facility: CLINIC | Age: 42
End: 2022-07-18

## 2022-07-18 DIAGNOSIS — G47.00 INSOMNIA, UNSPECIFIED TYPE: ICD-10-CM

## 2022-07-18 DIAGNOSIS — F40.10 SOCIAL ANXIETY DISORDER: ICD-10-CM

## 2022-07-18 DIAGNOSIS — F31.30 BIPOLAR AFFECTIVE DISORDER, CURRENT EPISODE DEPRESSED, CURRENT EPISODE SEVERITY UNSPECIFIED: ICD-10-CM

## 2022-07-18 DIAGNOSIS — F43.10 POST TRAUMATIC STRESS DISORDER (PTSD): Primary | ICD-10-CM

## 2022-07-18 PROCEDURE — 99213 OFFICE O/P EST LOW 20 MIN: CPT | Performed by: PHYSICIAN ASSISTANT

## 2022-07-18 RX ORDER — TRAZODONE HYDROCHLORIDE 50 MG/1
TABLET ORAL
Qty: 60 TABLET | Refills: 1 | Status: SHIPPED | OUTPATIENT
Start: 2022-07-18 | End: 2022-08-30 | Stop reason: SDUPTHER

## 2022-07-18 RX ORDER — GABAPENTIN 100 MG/1
100 CAPSULE ORAL 3 TIMES DAILY
Qty: 90 CAPSULE | Refills: 1 | Status: SHIPPED | OUTPATIENT
Start: 2022-07-18 | End: 2022-08-30 | Stop reason: SDUPTHER

## 2022-07-18 NOTE — PROGRESS NOTES
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 Non Reactive  Non Reactive Final   Admission on 01/17/2022, Discharged on 01/17/2022   Component Date Value Ref Range Status   • Glucose 01/17/2022 102 (A) 65 - 99 mg/dL Final   • BUN 01/17/2022 10  6 - 20 mg/dL Final   • Creatinine 01/17/2022 0.93  0.57 - 1.00 mg/dL Final   • Sodium 01/17/2022 138  136 - 145 mmol/L Final   • Potassium 01/17/2022 4.1  3.5 - 5.2 mmol/L Final   • Chloride 01/17/2022 101  98 - 107 mmol/L Final   • CO2 01/17/2022 24.3  22.0 - 29.0 mmol/L Final   • Calcium 01/17/2022 9.7  8.6 - 10.5 mg/dL Final   • Total Protein 01/17/2022 7.4  6.0 - 8.5 g/dL Final   • Albumin 01/17/2022 4.70  3.50 - 5.20 g/dL Final   • ALT (SGPT) 01/17/2022 27  1 - 33 U/L Final   • AST (SGOT) 01/17/2022 21  1 - 32 U/L Final   • Alkaline Phosphatase 01/17/2022 68  39 - 117 U/L Final   • Total Bilirubin 01/17/2022 0.2  0.0 - 1.2 mg/dL Final   • eGFR Non  Amer 01/17/2022 66  >60 mL/min/1.73 Final   • Globulin 01/17/2022 2.7  gm/dL Final   • A/G Ratio 01/17/2022 1.7  g/dL Final   • BUN/Creatinine Ratio 01/17/2022 10.8  7.0 - 25.0 Final   • Anion Gap 01/17/2022 12.7  5.0 - 15.0 mmol/L Final   • WBC 01/17/2022 11.50 (A) 3.40 - 10.80 10*3/mm3 Final   • RBC 01/17/2022 4.91  3.77 - 5.28 10*6/mm3 Final   • Hemoglobin 01/17/2022 15.3  12.0 - 15.9 g/dL Final   •  Hematocrit 01/17/2022 45.6  34.0 - 46.6 % Final   • MCV 01/17/2022 92.9  79.0 - 97.0 fL Final   • MCH 01/17/2022 31.2  26.6 - 33.0 pg Final   • MCHC 01/17/2022 33.6  31.5 - 35.7 g/dL Final   • RDW 01/17/2022 13.5  12.3 - 15.4 % Final   • RDW-SD 01/17/2022 46.6  37.0 - 54.0 fl Final   • MPV 01/17/2022 10.4  6.0 - 12.0 fL Final   • Platelets 01/17/2022 321  140 - 450 10*3/mm3 Final   • Neutrophil % 01/17/2022 59.1  42.7 - 76.0 % Final   • Lymphocyte % 01/17/2022 31.6  19.6 - 45.3 % Final   • Monocyte % 01/17/2022 6.1  5.0 - 12.0 % Final   • Eosinophil % 01/17/2022 2.0  0.3 - 6.2 % Final   • Basophil % 01/17/2022 0.8  0.0 - 1.5 % Final   • Immature Grans % 01/17/2022 0.4  0.0 - 0.5 % Final   • Neutrophils, Absolute 01/17/2022 6.80  1.70 - 7.00 10*3/mm3 Final   • Lymphocytes, Absolute 01/17/2022 3.63 (A) 0.70 - 3.10 10*3/mm3 Final   • Monocytes, Absolute 01/17/2022 0.70  0.10 - 0.90 10*3/mm3 Final   • Eosinophils, Absolute 01/17/2022 0.23  0.00 - 0.40 10*3/mm3 Final   • Basophils, Absolute 01/17/2022 0.09  0.00 - 0.20 10*3/mm3 Final   • Immature Grans, Absolute 01/17/2022 0.05  0.00 - 0.05 10*3/mm3 Final   • nRBC 01/17/2022 0.0  0.0 - 0.2 /100 WBC Final   • Extra Tube 01/17/2022 Hold for add-ons.   Final    Auto resulted.   • Extra Tube 01/17/2022 hold for add-on   Final    Auto resulted   • Extra Tube 01/17/2022 Hold for add-ons.   Final    Auto resulted.   • Extra Tube 01/17/2022 hold for add-on   Final    Auto resulted   • Extra Tube 01/17/2022 Hold for add-ons.   Final    Auto resulted.   • Extra Tube 01/17/2022 Hold for add-ons.   Final    Auto resulted.   • Blood Culture 01/17/2022 No growth at 5 days   Final   • Blood Culture 01/17/2022 No growth at 5 days   Final   Office Visit on 01/12/2022   Component Date Value Ref Range Status   • Color 01/12/2022 Yellow  Yellow, Straw, Dark Yellow, Ruth Final   • Clarity, UA 01/12/2022 Clear  Clear Final   • Specific Gravity  01/12/2022 1.030  1.005 - 1.030 Final   • pH,  Urine 01/12/2022 5.0  5.0 - 8.0 Final   • Leukocytes 01/12/2022 Negative  Negative Final   • Nitrite, UA 01/12/2022 Negative  Negative Final   • Protein, POC 01/12/2022 Negative  Negative mg/dL Final   • Glucose, UA 01/12/2022 Negative  Negative, 1000 mg/dL (3+) mg/dL Final   • Ketones, UA 01/12/2022 Negative  Negative Final   • Urobilinogen, UA 01/12/2022 Normal  Normal Final   • Bilirubin 01/12/2022 Small (1+) (A) Negative Final   • Blood, UA 01/12/2022 Negative  Negative Final   • Lot Number 01/12/2022 [id number]   Final   • Expiration Date 01/12/2022 12/31/2022   Final   Office Visit on 12/28/2021   Component Date Value Ref Range Status   • Rapid Influenza A Ag 12/28/2021 Negative  Negative Final   • Rapid Influenza B Ag 12/28/2021 Negative  Negative Final   • Internal Control 12/28/2021 Passed  Passed Final   • Lot Number 12/28/2021 [id number]   Final   • Expiration Date 12/28/2021 4/27/2022   Final   • Rapid Strep A Screen 12/28/2021 Negative  Negative, VALID, INVALID, Not Performed Final   • Internal Control 12/28/2021 Passed  Passed Final   • Lot Number 12/28/2021 [id number]   Final   • Expiration Date 12/28/2021 3/25/2022   Final   • COVID19 12/28/2021 Not Detected  Not Detected - Ref. Range Final   Office Visit on 11/15/2021   Component Date Value Ref Range Status   • Folate 11/15/2021 6.40  4.78 - 24.20 ng/mL Final   • Vitamin B-12 11/15/2021 469  211 - 946 pg/mL Final   • Hemoglobin A1C 11/15/2021 6.60 (A) 4.80 - 5.60 % Final   • Glucose 11/15/2021 108 (A) 65 - 99 mg/dL Final   • BUN 11/15/2021 12  6 - 20 mg/dL Final   • Creatinine 11/15/2021 0.82  0.57 - 1.00 mg/dL Final   • Sodium 11/15/2021 139  136 - 145 mmol/L Final   • Potassium 11/15/2021 4.2  3.5 - 5.2 mmol/L Final   • Chloride 11/15/2021 105  98 - 107 mmol/L Final   • CO2 11/15/2021 23.7  22.0 - 29.0 mmol/L Final   • Calcium 11/15/2021 9.0  8.6 - 10.5 mg/dL Final   • Total Protein 11/15/2021 6.9  6.0 - 8.5 g/dL Final   • Albumin 11/15/2021 4.30  3.50 -  5.20 g/dL Final   • ALT (SGPT) 11/15/2021 26  1 - 33 U/L Final   • AST (SGOT) 11/15/2021 17  1 - 32 U/L Final   • Alkaline Phosphatase 11/15/2021 70  39 - 117 U/L Final   • Total Bilirubin 11/15/2021 <0.2  0.0 - 1.2 mg/dL Final   • eGFR Non  Amer 11/15/2021 76  >60 mL/min/1.73 Final   • Globulin 11/15/2021 2.6  gm/dL Final   • A/G Ratio 11/15/2021 1.7  g/dL Final   • BUN/Creatinine Ratio 11/15/2021 14.6  7.0 - 25.0 Final   • Anion Gap 11/15/2021 10.3  5.0 - 15.0 mmol/L Final   • Total Cholesterol 11/15/2021 177  0 - 200 mg/dL Final   • Triglycerides 11/15/2021 117  0 - 150 mg/dL Final   • HDL Cholesterol 11/15/2021 35 (A) 40 - 60 mg/dL Final   • LDL Cholesterol  11/15/2021 121 (A) 0 - 100 mg/dL Final   • VLDL Cholesterol 11/15/2021 21  5 - 40 mg/dL Final   • LDL/HDL Ratio 11/15/2021 3.39   Final   There may be more visits with results that are not included.       EKG Results:  No orders to display       Imaging Results:  No Images in the past 120 days found..      Assessment & Plan   Diagnoses and all orders for this visit:    1. Post traumatic stress disorder (PTSD) (Primary)  -     gabapentin (Neurontin) 100 MG capsule; Take 1 capsule by mouth 3 (Three) Times a Day for 30 days.  Dispense: 90 capsule; Refill: 1    2. Social anxiety disorder  -     gabapentin (Neurontin) 100 MG capsule; Take 1 capsule by mouth 3 (Three) Times a Day for 30 days.  Dispense: 90 capsule; Refill: 1    3. Insomnia, unspecified type  -     traZODone (DESYREL) 50 MG tablet; Take 1-2 tab PO QHS for sleep  Dispense: 60 tablet; Refill: 1  -     gabapentin (Neurontin) 100 MG capsule; Take 1 capsule by mouth 3 (Three) Times a Day for 30 days.  Dispense: 90 capsule; Refill: 1    4. Bipolar affective disorder, current episode depressed, current episode severity unspecified (HCC)  -     gabapentin (Neurontin) 100 MG capsule; Take 1 capsule by mouth 3 (Three) Times a Day for 30 days.  Dispense: 90 capsule; Refill: 1    Presentation seems most  consistent with bipolar disorder, anxiety, PTSD, social anxiety, and insomnia.  We will decrease gabapentin to 100 mg for management of anxiety and overall mood.  Consider switching to Lyrica if appropriate at next office visit.  We will continue trazodone at current dose for management of sleep as needed.  Reiterated the need for psychotherapy.  Follow-up in 1 month.  Addressed all questions and concerns.    Visit Diagnoses:    ICD-10-CM ICD-9-CM   1. Post traumatic stress disorder (PTSD)  F43.10 309.81   2. Social anxiety disorder  F40.10 300.23   3. Insomnia, unspecified type  G47.00 780.52   4. Bipolar affective disorder, current episode depressed, current episode severity unspecified ([facility])  F31.30 296.50       PLAN:  1. Safety: No acute safety concerns at this time.  2. Therapy: Continue therapy with Yesenia Ling LCSW  3. Risk Assessment: Risk of self-harm acutely is moderate to severe.  Risk factors include anxiety disorder, mood disorder, family history of daughter with suicide attempt, access to guns, h/o suicide attempt and self harm in the past, AODA, and recent psychosocial stressors (pandemic). Protective factors include no present SI, healthcare seeking, future orientation, willingness to engage in care.  Risk of self-harm chronically is also moderate to severe, but could be further elevated in the event of treatment noncompliance and/or AODA.  4. Labs/Diagnostics Ordered:   No orders of the defined types were placed in this encounter.    5. Medications:   New Medications Ordered This Visit   Medications   • traZODone (DESYREL) 50 MG tablet     Sig: Take 1-2 tab PO QHS for sleep     Dispense:  60 tablet     Refill:  1   • gabapentin (Neurontin) 100 MG capsule     Sig: Take 1 capsule by mouth 3 (Three) Times a Day for 30 days.     Dispense:  90 capsule     Refill:  1     Discussed all risks, benefits, alternatives, and side effects of Trazodone including but not limited to GI upset, sexual  dysfunction, dizziness, headache, nervousness, bleeding risk, seizure risk, sedation, headache, activation of hong or hypomania, increased fragility fracture risk, cardiac arrhythmias, priapism, hyponatremia, ocular effects, prolonged QT interval, withdrawal syndrome following abrupt discontinuation, serotonin syndrome, and activation of suicidal ideation and behavior.  Pt educated on the need to practice safe sex while taking this med. Discussed the need for pt to immediately call the office for any new or worsening symptoms, such as worsening depression; feeling nervous or restless; suicidal thoughts or actions; or other changes changes in mood or behavior, and all other concerns. Pt educated on med compliance and the risks of suddenly stopping this medication or missing doses. Pt verbalized understanding and is agreeable to taking Trazodone. Addressed all questions and concerns.     Discussed all risks, benefits, alternatives, and side effects of Gabapentin including but not limited to sedation, dizziness, GI upset, dry mouth, and weight gain. Pt instructed to avoid driving and doing other tasks or actions that require to be alert until knowing how the drug affects them.  Pt educated on the need to practice safe sex while taking this med. Discussed the need for pt to immediately call the office for any new or worsening symptoms, such as worsening depression; feeling nervous or restless; suicidal thoughts or actions; or other changes changes in mood or behavior, and all other concerns. Pt educated on med compliance and the risks of suddenly stopping this medication or missing doses. Pt verbalized understanding and is agreeable to taking Gabapentin. Addressed all questions and concerns.  Will order UDS and obtain CAMMY report. Pt verbally signed controlled substances agreement.      6. Follow up:   F/u in 1 month.    TREATMENT PLAN/GOALS: Continue supportive psychotherapy efforts and medications as indicated.  Treatment and medication options discussed during today's visit. Patient ackowledged and verbally consented to continue with current treatment plan and was educated on the importance of compliance with treatment and follow-up appointments.    MEDICATION ISSUES:  CAMMY reviewed as expected.  Discussed medication options and treatment plan of prescribed medication as well as the risks, benefits, and side effects including potential falls, possible impaired driving and metabolic adversities among others. Patient is agreeable to call the office with any worsening of symptoms or onset of side effects. Patient is agreeable to call 911 or go to the nearest ER should he/she begin having SI/HI. No medication side effects or related complaints today.            This document has been electronically signed by Zulma Guerrero PA-C  July 18, 2022 10:01 EDT      Part of this note may be an electronic transcription/translation of spoken language to printed text using the Dragon Dictation System.

## 2022-08-30 ENCOUNTER — TELEMEDICINE (OUTPATIENT)
Dept: BEHAVIORAL HEALTH | Facility: CLINIC | Age: 42
End: 2022-08-30

## 2022-08-30 DIAGNOSIS — F40.10 SOCIAL ANXIETY DISORDER: ICD-10-CM

## 2022-08-30 DIAGNOSIS — G47.00 INSOMNIA, UNSPECIFIED TYPE: ICD-10-CM

## 2022-08-30 DIAGNOSIS — F31.76 BIPOLAR DISORDER, IN FULL REMISSION, MOST RECENT EPISODE DEPRESSED: Primary | ICD-10-CM

## 2022-08-30 DIAGNOSIS — F43.10 POST TRAUMATIC STRESS DISORDER (PTSD): ICD-10-CM

## 2022-08-30 PROCEDURE — 99213 OFFICE O/P EST LOW 20 MIN: CPT | Performed by: PHYSICIAN ASSISTANT

## 2022-08-30 RX ORDER — TRAZODONE HYDROCHLORIDE 50 MG/1
TABLET ORAL
Qty: 60 TABLET | Refills: 2 | Status: SHIPPED | OUTPATIENT
Start: 2022-08-30 | End: 2022-11-21 | Stop reason: SDUPTHER

## 2022-08-30 RX ORDER — GABAPENTIN 100 MG/1
100 CAPSULE ORAL 3 TIMES DAILY
Qty: 90 CAPSULE | Refills: 2 | Status: SHIPPED | OUTPATIENT
Start: 2022-08-30 | End: 2022-11-21

## 2022-08-30 RX ORDER — COVID-19 MOLECULAR TEST ASSAY
KIT MISCELLANEOUS
COMMUNITY
Start: 2022-07-27 | End: 2023-04-03

## 2022-08-30 NOTE — PROGRESS NOTES
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 mL/min/1.73 Final   • Globulin 01/17/2022 2.7  gm/dL Final   • A/G Ratio 01/17/2022 1.7  g/dL Final   • BUN/Creatinine Ratio 01/17/2022 10.8  7.0 - 25.0 Final   • Anion Gap 01/17/2022 12.7  5.0 - 15.0 mmol/L Final   • WBC 01/17/2022 11.50 (A) 3.40 - 10.80 10*3/mm3 Final   • RBC 01/17/2022 4.91  3.77 - 5.28 10*6/mm3 Final   • Hemoglobin 01/17/2022 15.3  12.0 - 15.9 g/dL Final   • Hematocrit 01/17/2022 45.6  34.0 - 46.6 % Final   • MCV 01/17/2022 92.9  79.0 - 97.0 fL Final   • MCH 01/17/2022 31.2  26.6 - 33.0 pg Final   • MCHC 01/17/2022 33.6  31.5 - 35.7 g/dL Final   • RDW 01/17/2022 13.5  12.3 - 15.4 % Final   • RDW-SD 01/17/2022 46.6  37.0 - 54.0 fl Final   • MPV 01/17/2022 10.4  6.0 - 12.0 fL Final   • Platelets 01/17/2022 321  140 - 450 10*3/mm3 Final   • Neutrophil % 01/17/2022 59.1  42.7 - 76.0 % Final   • Lymphocyte % 01/17/2022 31.6  19.6 - 45.3 % Final   • Monocyte % 01/17/2022 6.1  5.0 - 12.0 % Final   • Eosinophil % 01/17/2022 2.0  0.3 - 6.2 % Final   • Basophil % 01/17/2022 0.8  0.0 - 1.5 % Final   • Immature Grans % 01/17/2022 0.4  0.0 - 0.5 % Final   • Neutrophils, Absolute 01/17/2022 6.80  1.70 - 7.00 10*3/mm3 Final   • Lymphocytes, Absolute 01/17/2022 3.63 (A) 0.70 - 3.10 10*3/mm3 Final   • Monocytes, Absolute 01/17/2022 0.70  0.10 - 0.90 10*3/mm3 Final   • Eosinophils,  Absolute 01/17/2022 0.23  0.00 - 0.40 10*3/mm3 Final   • Basophils, Absolute 01/17/2022 0.09  0.00 - 0.20 10*3/mm3 Final   • Immature Grans, Absolute 01/17/2022 0.05  0.00 - 0.05 10*3/mm3 Final   • nRBC 01/17/2022 0.0  0.0 - 0.2 /100 WBC Final   • Extra Tube 01/17/2022 Hold for add-ons.   Final    Auto resulted.   • Extra Tube 01/17/2022 hold for add-on   Final    Auto resulted   • Extra Tube 01/17/2022 Hold for add-ons.   Final    Auto resulted.   • Extra Tube 01/17/2022 hold for add-on   Final    Auto resulted   • Extra Tube 01/17/2022 Hold for add-ons.   Final    Auto resulted.   • Extra Tube 01/17/2022 Hold for add-ons.   Final    Auto resulted.   • Blood Culture 01/17/2022 No growth at 5 days   Final   • Blood Culture 01/17/2022 No growth at 5 days   Final   Office Visit on 01/12/2022   Component Date Value Ref Range Status   • Color 01/12/2022 Yellow  Yellow, Straw, Dark Yellow, Ruth Final   • Clarity, UA 01/12/2022 Clear  Clear Final   • Specific Gravity  01/12/2022 1.030  1.005 - 1.030 Final   • pH, Urine 01/12/2022 5.0  5.0 - 8.0 Final   • Leukocytes 01/12/2022 Negative  Negative Final   • Nitrite, UA 01/12/2022 Negative  Negative Final   • Protein, POC 01/12/2022 Negative  Negative mg/dL Final   • Glucose, UA 01/12/2022 Negative  Negative, 1000 mg/dL (3+) mg/dL Final   • Ketones, UA 01/12/2022 Negative  Negative Final   • Urobilinogen, UA 01/12/2022 Normal  Normal Final   • Bilirubin 01/12/2022 Small (1+) (A) Negative Final   • Blood, UA 01/12/2022 Negative  Negative Final   • Lot Number 01/12/2022 [id number]   Final   • Expiration Date 01/12/2022 12/31/2022   Final   Office Visit on 12/28/2021   Component Date Value Ref Range Status   • Rapid Influenza A Ag 12/28/2021 Negative  Negative Final   • Rapid Influenza B Ag 12/28/2021 Negative  Negative Final   • Internal Control 12/28/2021 Passed  Passed Final   • Lot Number 12/28/2021 [id number]   Final   • Expiration Date 12/28/2021 4/27/2022   Final   • Rapid  Strep A Screen 12/28/2021 Negative  Negative, VALID, INVALID, Not Performed Final   • Internal Control 12/28/2021 Passed  Passed Final   • Lot Number 12/28/2021 [id number]   Final   • Expiration Date 12/28/2021 3/25/2022   Final   • COVID19 12/28/2021 Not Detected  Not Detected - Ref. Range Final   Office Visit on 11/15/2021   Component Date Value Ref Range Status   • Folate 11/15/2021 6.40  4.78 - 24.20 ng/mL Final   • Vitamin B-12 11/15/2021 469  211 - 946 pg/mL Final   • Hemoglobin A1C 11/15/2021 6.60 (A) 4.80 - 5.60 % Final   • Glucose 11/15/2021 108 (A) 65 - 99 mg/dL Final   • BUN 11/15/2021 12  6 - 20 mg/dL Final   • Creatinine 11/15/2021 0.82  0.57 - 1.00 mg/dL Final   • Sodium 11/15/2021 139  136 - 145 mmol/L Final   • Potassium 11/15/2021 4.2  3.5 - 5.2 mmol/L Final   • Chloride 11/15/2021 105  98 - 107 mmol/L Final   • CO2 11/15/2021 23.7  22.0 - 29.0 mmol/L Final   • Calcium 11/15/2021 9.0  8.6 - 10.5 mg/dL Final   • Total Protein 11/15/2021 6.9  6.0 - 8.5 g/dL Final   • Albumin 11/15/2021 4.30  3.50 - 5.20 g/dL Final   • ALT (SGPT) 11/15/2021 26  1 - 33 U/L Final   • AST (SGOT) 11/15/2021 17  1 - 32 U/L Final   • Alkaline Phosphatase 11/15/2021 70  39 - 117 U/L Final   • Total Bilirubin 11/15/2021 <0.2  0.0 - 1.2 mg/dL Final   • eGFR Non  Amer 11/15/2021 76  >60 mL/min/1.73 Final   • Globulin 11/15/2021 2.6  gm/dL Final   • A/G Ratio 11/15/2021 1.7  g/dL Final   • BUN/Creatinine Ratio 11/15/2021 14.6  7.0 - 25.0 Final   • Anion Gap 11/15/2021 10.3  5.0 - 15.0 mmol/L Final   • Total Cholesterol 11/15/2021 177  0 - 200 mg/dL Final   • Triglycerides 11/15/2021 117  0 - 150 mg/dL Final   • HDL Cholesterol 11/15/2021 35 (A) 40 - 60 mg/dL Final   • LDL Cholesterol  11/15/2021 121 (A) 0 - 100 mg/dL Final   • VLDL Cholesterol 11/15/2021 21  5 - 40 mg/dL Final   • LDL/HDL Ratio 11/15/2021 3.39   Final   There may be more visits with results that are not included.       EKG Results:  No orders to display        Imaging Results:  No Images in the past 120 days found..      Assessment & Plan   Diagnoses and all orders for this visit:    1. Bipolar disorder, in full remission, most recent episode depressed (HCC) (Primary)  -     gabapentin (Neurontin) 100 MG capsule; Take 1 capsule by mouth 3 (Three) Times a Day for 30 days.  Dispense: 90 capsule; Refill: 2    2. Post traumatic stress disorder (PTSD)  -     gabapentin (Neurontin) 100 MG capsule; Take 1 capsule by mouth 3 (Three) Times a Day for 30 days.  Dispense: 90 capsule; Refill: 2    3. Social anxiety disorder  -     gabapentin (Neurontin) 100 MG capsule; Take 1 capsule by mouth 3 (Three) Times a Day for 30 days.  Dispense: 90 capsule; Refill: 2    4. Insomnia, unspecified type  -     gabapentin (Neurontin) 100 MG capsule; Take 1 capsule by mouth 3 (Three) Times a Day for 30 days.  Dispense: 90 capsule; Refill: 2  -     traZODone (DESYREL) 50 MG tablet; Take 1-2 tab PO QHS for sleep  Dispense: 60 tablet; Refill: 2    Presentation seems most consistent with bipolar disorder, anxiety, PTSD, social anxiety, and insomnia.  We will continue gabapentin at current dose for management of anxiety and overall mood.   We will continue trazodone at current dose for management of sleep as needed. Continue therapy. Follow-up in 2 months.  Addressed all questions and concerns.    Visit Diagnoses:    ICD-10-CM ICD-9-CM   1. Bipolar disorder, in full remission, most recent episode depressed (HCC)  F31.76 296.56   2. Post traumatic stress disorder (PTSD)  F43.10 309.81   3. Social anxiety disorder  F40.10 300.23   4. Insomnia, unspecified type  G47.00 780.52       PLAN:  1. Safety: No acute safety concerns at this time.  2. Therapy: Continue therapy with Yesenia Ling LCSW  3. Risk Assessment: Risk of self-harm acutely is moderate to severe.  Risk factors include anxiety disorder, mood disorder, family history of daughter with suicide attempt, access to guns, h/o suicide attempt  and self harm in the past, AODA, and recent psychosocial stressors (pandemic). Protective factors include no present SI, healthcare seeking, future orientation, willingness to engage in care.  Risk of self-harm chronically is also moderate to severe, but could be further elevated in the event of treatment noncompliance and/or AODA.  4. Labs/Diagnostics Ordered:   No orders of the defined types were placed in this encounter.    5. Medications:   New Medications Ordered This Visit   Medications   • gabapentin (Neurontin) 100 MG capsule     Sig: Take 1 capsule by mouth 3 (Three) Times a Day for 30 days.     Dispense:  90 capsule     Refill:  2   • traZODone (DESYREL) 50 MG tablet     Sig: Take 1-2 tab PO QHS for sleep     Dispense:  60 tablet     Refill:  2     Discussed all risks, benefits, alternatives, and side effects of Trazodone including but not limited to GI upset, sexual dysfunction, dizziness, headache, nervousness, bleeding risk, seizure risk, sedation, headache, activation of hong or hypomania, increased fragility fracture risk, cardiac arrhythmias, priapism, hyponatremia, ocular effects, prolonged QT interval, withdrawal syndrome following abrupt discontinuation, serotonin syndrome, and activation of suicidal ideation and behavior.  Pt educated on the need to practice safe sex while taking this med. Discussed the need for pt to immediately call the office for any new or worsening symptoms, such as worsening depression; feeling nervous or restless; suicidal thoughts or actions; or other changes changes in mood or behavior, and all other concerns. Pt educated on med compliance and the risks of suddenly stopping this medication or missing doses. Pt verbalized understanding and is agreeable to taking Trazodone. Addressed all questions and concerns.     Discussed all risks, benefits, alternatives, and side effects of Gabapentin including but not limited to sedation, dizziness, GI upset, dry mouth, and weight  gain. Pt instructed to avoid driving and doing other tasks or actions that require to be alert until knowing how the drug affects them.  Pt educated on the need to practice safe sex while taking this med. Discussed the need for pt to immediately call the office for any new or worsening symptoms, such as worsening depression; feeling nervous or restless; suicidal thoughts or actions; or other changes changes in mood or behavior, and all other concerns. Pt educated on med compliance and the risks of suddenly stopping this medication or missing doses. Pt verbalized understanding and is agreeable to taking Gabapentin. Addressed all questions and concerns.  Will order UDS and obtain CAMMY report. Pt verbally signed controlled substances agreement.      6. Follow up:   F/u in 2 months.    TREATMENT PLAN/GOALS: Continue supportive psychotherapy efforts and medications as indicated. Treatment and medication options discussed during today's visit. Patient ackowledged and verbally consented to continue with current treatment plan and was educated on the importance of compliance with treatment and follow-up appointments.    MEDICATION ISSUES:  CAMMY reviewed as expected.  Discussed medication options and treatment plan of prescribed medication as well as the risks, benefits, and side effects including potential falls, possible impaired driving and metabolic adversities among others. Patient is agreeable to call the office with any worsening of symptoms or onset of side effects. Patient is agreeable to call 911 or go to the nearest ER should he/she begin having SI/HI. No medication side effects or related complaints today.            This document has been electronically signed by Zulma Guerrero PA-C  August 30, 2022 15:17 EDT      Part of this note may be an electronic transcription/translation of spoken language to printed text using the Dragon Dictation System.

## 2022-09-21 NOTE — PROGRESS NOTES
"Progress Note    Date: September 21, 2022  Time In: 0900  Time Out: 0955    Patient Name: Nadiya Rodríguez  Patient Age: 42 y.o.    Mode of visit: Video  Location of provider: [address], [place] [address]  Location of patient: Home    Patient is seen remotely using [employer]. Patient is being seen via telehealth and patient confirms that they are in a secure environment for this session. The patient's condition being diagnosed/treated is appropriate for telemedicine. The provider identified herself as well as her credentials. The patient gave consent to be seen remotely, and when consent is given they understand that the consent allows for patient identifiable information to be sent to a third party as needed. They may refuse to be seen remotely at any time. The electronic data is encrypted and password protected, and the patient has been advised of the potential risks to privacy not withstanding such measures. Patient consented to the use of video for the purpose of a telehealth session today. The visit included audio and video interaction. No technical issues occurred during this visit.      Subjective   History of Present Illness     From previous progress note on 5/6/22:  Discussed and educated patient on the cycle of anxiety and grounding techniques.  Patient states that she has been using similar techniques and was encouraged to practice these even when not feeling anxious.  Patient states that she is motivated to continue therapy to reduce her symptoms.     Nadiya Rodríguez is a 42 y.o. female who presents today as a follow-up for continued psychotherapy.  Patient reports that she has been utilizing deep breathing exercises in order to manage her mood, especially her anxiety.  She states that she is compliant with her medication, and feels that it is effective.  She states that she has not felt \"depressed\" in several months.  She states that she still struggles with " "anxiety, especially in \"large crowds\" and while driving on busy roads.      Assessment    Mental Status Exam     Appearance: appeared to have good hygiene  Behavior: calm  Cooperation:  engaged, cooperative, attentive, and friendly  Eye Contact:  good  Affect:  bright and congruent  Mood: anxious  Speech: responsive  Thought Process:  linear and organized  Thought Content: appropriate and abstract  Suicidal: denies  Homicidal:  denies  Hallucinations:  denies  Memory:  intact  Orientation:  person, place, time, and situation  Reliability:  reliable  Insight:  good  Judgement:  good     Clinical Intervention     No diagnosis found.     Individual psychotherapy was provided utilizing CBT techniques to restore adaptive functioning, provide symptom relief and challenge negative thinking patterns.  Patient had good insight into her symptoms and patterns of behavior. Discussed progressive muscle relaxation techniques and practiced these with patient.  Patient participated in session as expected.  She states that she is motivated to develop positive affirmations in order to build self confidence and self esteem.    Plan   Plan & Goals     Patient was encouraged to identify tense areas of the body, and was recommended to practice progressive muscle relaxation at least 5-10 minutes daily in an effort to promote mindfulness and relaxation.   We will continue to work on positive affirmations in the next session.    1. Patient acknowledged and verbally consented to continue working toward resolving current treatment plan goals and was educated on the importance of participation in the therapeutic process.  2. Patient will remain compliant with medication regimen as prescribed. Discuss any medication side effects, questions or concerns with prescribing provider.  3. Call 911 or present to the nearest emergency room in an emergency situation. National Suicide Prevention Lifeline: 1-219.270.7522.    No follow-ups on " file.    ____________________  This document has been electronically signed by Yesenia Ling LCSW  September 21, 2022 14:57 EDT    Part of this note may be an electronic transcription/translation of spoken language to printed text using the Dragon Dictation System.

## 2022-09-26 ENCOUNTER — TELEMEDICINE (OUTPATIENT)
Dept: PSYCHIATRY | Facility: CLINIC | Age: 42
End: 2022-09-26

## 2022-09-26 DIAGNOSIS — F43.10 POST TRAUMATIC STRESS DISORDER (PTSD): Primary | ICD-10-CM

## 2022-09-26 DIAGNOSIS — F40.10 SOCIAL ANXIETY DISORDER: ICD-10-CM

## 2022-09-26 NOTE — PATIENT INSTRUCTIONS
PERSONAL DEVELOPMENT AFFIRMATIONS FOR A BETTER VERSION OF YOU  Whether you want to improve your skills or are committed to fully reinventing yourself, personal development starts with a positive mindset. Try working these personal development affirmations into your daily routine.    I detach from anything that does not serve me.   I am a powerful creator and will create the life I want.  I surround myself with positive people who bring out the best in me.   I'm allowed to make mistakes; they don't define me.    I am proud of my skills and have worked hard to succeed.   My talents are something no one can ever take away from me.    I am solution-minded. I can solve any problem that comes my way.  I am doing the best I can with the knowledge and experience I have obtained thus far.  I am building the most fantastic future.   I am a work in progress; the best is yet to come.
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
## 2022-11-21 ENCOUNTER — TELEMEDICINE (OUTPATIENT)
Dept: BEHAVIORAL HEALTH | Facility: CLINIC | Age: 42
End: 2022-11-21

## 2022-11-21 DIAGNOSIS — F31.76 BIPOLAR DISORDER, IN FULL REMISSION, MOST RECENT EPISODE DEPRESSED: Primary | ICD-10-CM

## 2022-11-21 DIAGNOSIS — G47.00 INSOMNIA, UNSPECIFIED TYPE: ICD-10-CM

## 2022-11-21 DIAGNOSIS — F43.10 POST TRAUMATIC STRESS DISORDER (PTSD): ICD-10-CM

## 2022-11-21 DIAGNOSIS — F40.10 SOCIAL ANXIETY DISORDER: ICD-10-CM

## 2022-11-21 PROCEDURE — 90833 PSYTX W PT W E/M 30 MIN: CPT | Performed by: PHYSICIAN ASSISTANT

## 2022-11-21 PROCEDURE — 99214 OFFICE O/P EST MOD 30 MIN: CPT | Performed by: PHYSICIAN ASSISTANT

## 2022-11-21 RX ORDER — TRAZODONE HYDROCHLORIDE 50 MG/1
TABLET ORAL
Qty: 60 TABLET | Refills: 2 | Status: SHIPPED | OUTPATIENT
Start: 2022-11-21 | End: 2023-02-22

## 2022-11-21 NOTE — PROGRESS NOTES
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 11.50 (H)  3.40 - 10.80 10*3/mm3 Final   • RBC 01/17/2022 4.91  3.77 - 5.28 10*6/mm3 Final   • Hemoglobin 01/17/2022 15.3  12.0 - 15.9 g/dL Final   • Hematocrit 01/17/2022 45.6  34.0 - 46.6 % Final   • MCV 01/17/2022 92.9  79.0 - 97.0 fL Final   • MCH 01/17/2022 31.2  26.6 - 33.0 pg Final   • MCHC 01/17/2022 33.6  31.5 - 35.7 g/dL Final   • RDW 01/17/2022 13.5  12.3 - 15.4 % Final   • RDW-SD 01/17/2022 46.6  37.0 - 54.0 fl Final   • MPV 01/17/2022 10.4  6.0 - 12.0 fL Final   • Platelets 01/17/2022 321  140 - 450 10*3/mm3 Final   • Neutrophil % 01/17/2022 59.1  42.7 - 76.0 % Final   • Lymphocyte % 01/17/2022 31.6  19.6 - 45.3 % Final   • Monocyte % 01/17/2022 6.1  5.0 - 12.0 % Final   • Eosinophil % 01/17/2022 2.0  0.3 - 6.2 % Final   • Basophil % 01/17/2022 0.8  0.0 - 1.5 % Final   • Immature Grans % 01/17/2022 0.4  0.0 - 0.5 % Final   • Neutrophils, Absolute 01/17/2022 6.80  1.70 - 7.00 10*3/mm3 Final   • Lymphocytes, Absolute 01/17/2022 3.63 (H)  0.70 - 3.10 10*3/mm3 Final   • Monocytes, Absolute 01/17/2022 0.70  0.10 - 0.90 10*3/mm3 Final   • Eosinophils, Absolute 01/17/2022 0.23  0.00 - 0.40 10*3/mm3 Final   • Basophils, Absolute 01/17/2022 0.09  0.00 - 0.20 10*3/mm3 Final   • Immature Grans, Absolute 01/17/2022 0.05  0.00 - 0.05 10*3/mm3 Final    • nRBC 01/17/2022 0.0  0.0 - 0.2 /100 WBC Final   • Extra Tube 01/17/2022 Hold for add-ons.   Final    Auto resulted.   • Extra Tube 01/17/2022 hold for add-on   Final    Auto resulted   • Extra Tube 01/17/2022 Hold for add-ons.   Final    Auto resulted.   • Extra Tube 01/17/2022 hold for add-on   Final    Auto resulted   • Extra Tube 01/17/2022 Hold for add-ons.   Final    Auto resulted.   • Extra Tube 01/17/2022 Hold for add-ons.   Final    Auto resulted.   • Blood Culture 01/17/2022 No growth at 5 days   Final   • Blood Culture 01/17/2022 No growth at 5 days   Final   Office Visit on 01/12/2022   Component Date Value Ref Range Status   • Color 01/12/2022 Yellow  Yellow, Straw, Dark Yellow, Ruth Final   • Clarity, UA 01/12/2022 Clear  Clear Final   • Specific Gravity  01/12/2022 1.030  1.005 - 1.030 Final   • pH, Urine 01/12/2022 5.0  5.0 - 8.0 Final   • Leukocytes 01/12/2022 Negative  Negative Final   • Nitrite, UA 01/12/2022 Negative  Negative Final   • Protein, POC 01/12/2022 Negative  Negative mg/dL Final   • Glucose, UA 01/12/2022 Negative  Negative, 1000 mg/dL (3+) mg/dL Final   • Ketones, UA 01/12/2022 Negative  Negative Final   • Urobilinogen, UA 01/12/2022 Normal  Normal Final   • Bilirubin 01/12/2022 Small (1+) (A)  Negative Final   • Blood, UA 01/12/2022 Negative  Negative Final   • Lot Number 01/12/2022 [id number]   Final   • Expiration Date 01/12/2022 12/31/2022   Final   Office Visit on 12/28/2021   Component Date Value Ref Range Status   • Rapid Influenza A Ag 12/28/2021 Negative  Negative Final   • Rapid Influenza B Ag 12/28/2021 Negative  Negative Final   • Internal Control 12/28/2021 Passed  Passed Final   • Lot Number 12/28/2021 [id number]   Final   • Expiration Date 12/28/2021 4/27/2022   Final   • Rapid Strep A Screen 12/28/2021 Negative  Negative, VALID, INVALID, Not Performed Final   • Internal Control 12/28/2021 Passed  Passed Final   • Lot Number 12/28/2021 [id number]   Final   • Expiration Date  12/28/2021 3/25/2022   Final   • COVID19 12/28/2021 Not Detected  Not Detected - Ref. Range Final       EKG Results:  No orders to display       Imaging Results:  No Images in the past 120 days found..      Assessment & Plan   Diagnoses and all orders for this visit:    1. Bipolar disorder, in full remission, most recent episode depressed (HCC) (Primary)    2. Post traumatic stress disorder (PTSD)    3. Social anxiety disorder    4. Insomnia, unspecified type  -     traZODone (DESYREL) 50 MG tablet; Take 1-2 tab PO QHS for sleep  Dispense: 60 tablet; Refill: 2    Presentation seems most consistent with bipolar disorder, anxiety, PTSD, social anxiety, and insomnia.  We will discontinue gabapentin due to reported side effects and the patient has already stopped this medication.  Patient declines further medication at this time.  We will continue trazodone for management of sleep as needed.  Reiterated the need to continue therapy.  Follow up in 1 to 2 months.  Addressed all questions and concerns.    Visit Diagnoses:    ICD-10-CM ICD-9-CM   1. Bipolar disorder, in full remission, most recent episode depressed (HCC)  F31.76 296.56   2. Post traumatic stress disorder (PTSD)  F43.10 309.81   3. Social anxiety disorder  F40.10 300.23   4. Insomnia, unspecified type  G47.00 780.52       PLAN:  1. Safety: No acute safety concerns at this time.  2. Therapy: Continue therapy with Yesenia Ling LCSW  3. Risk Assessment: Risk of self-harm acutely is moderate to severe.  Risk factors include anxiety disorder, mood disorder, family history of daughter with suicide attempt, access to guns, h/o suicide attempt and self harm in the past, AODA, and recent psychosocial stressors (pandemic). Protective factors include no present SI, healthcare seeking, future orientation, willingness to engage in care.  Risk of self-harm chronically is also moderate to severe, but could be further elevated in the event of treatment noncompliance  and/or AODA.  4. Labs/Diagnostics Ordered:   No orders of the defined types were placed in this encounter.    5. Medications:   New Medications Ordered This Visit   Medications   • traZODone (DESYREL) 50 MG tablet     Sig: Take 1-2 tab PO QHS for sleep     Dispense:  60 tablet     Refill:  2     Discussed all risks, benefits, alternatives, and side effects of Trazodone including but not limited to GI upset, sexual dysfunction, dizziness, headache, nervousness, bleeding risk, seizure risk, sedation, headache, activation of hong or hypomania, increased fragility fracture risk, cardiac arrhythmias, priapism, hyponatremia, ocular effects, prolonged QT interval, withdrawal syndrome following abrupt discontinuation, serotonin syndrome, and activation of suicidal ideation and behavior.  Pt educated on the need to practice safe sex while taking this med. Discussed the need for pt to immediately call the office for any new or worsening symptoms, such as worsening depression; feeling nervous or restless; suicidal thoughts or actions; or other changes changes in mood or behavior, and all other concerns. Pt educated on med compliance and the risks of suddenly stopping this medication or missing doses. Pt verbalized understanding and is agreeable to taking Trazodone. Addressed all questions and concerns.     6. Follow up:   F/u in 1-2 months.    TREATMENT PLAN/GOALS: Continue supportive psychotherapy efforts and medications as indicated. Treatment and medication options discussed during today's visit. Patient ackowledged and verbally consented to continue with current treatment plan and was educated on the importance of compliance with treatment and follow-up appointments.    MEDICATION ISSUES:  CAMMY reviewed as expected.  Discussed medication options and treatment plan of prescribed medication as well as the risks, benefits, and side effects including potential falls, possible impaired driving and metabolic adversities among  others. Patient is agreeable to call the office with any worsening of symptoms or onset of side effects. Patient is agreeable to call 911 or go to the nearest ER should he/she begin having SI/HI. No medication side effects or related complaints today.     16 minutes of supportive psychotherapy with goal to strengthen defenses, promote problems solving, restore adaptive functioning and provide symptom relief. The therapeutic alliance was strengthened to encourage the patient to express their thoughts and feelings. Esteem building was enhanced through praise, reassurance, normalizing and encouragement. Coping skills were enhanced using mindfulness (deep breathing, progressive muscle relaxation, imagery, grounding & meditation) and cognitive behavioral strategies (reviewing cognitive distortions, negative self-talk/thought stopping and cognitive restructuring, through de-catastrophizing and examining options/alternatives) to build distress tolerance skills and emotional regulation.  Patient encouraged to practice negative thought stopping, in which the patient recognizes negative thoughts early and attempts to replace these thoughts with positive thoughts. Patient given education on medication side effects, diagnosis/illness and relapse symptoms. Plan to continue supportive psychotherapy in next appointment to provide symptom relief.        This document has been electronically signed by Zulma Guerrero PA-C  November 21, 2022 11:43 EST      Part of this note may be an electronic transcription/translation of spoken language to printed text using the Dragon Dictation System.

## 2022-12-06 ENCOUNTER — TELEPHONE (OUTPATIENT)
Dept: FAMILY MEDICINE CLINIC | Facility: CLINIC | Age: 42
End: 2022-12-06

## 2022-12-06 DIAGNOSIS — E53.8 VITAMIN B12 DEFICIENCY: Primary | ICD-10-CM

## 2022-12-06 NOTE — TELEPHONE ENCOUNTER
Caller: Nadiya Rodríguez    Relationship: Self    Best call back number: [phone]    Requested Prescriptions:   Requested Prescriptions      No prescriptions requested or ordered in this encounter    B12 SHOT    Pharmacy where request should be sent: [facility] - [place], [place] - [address] [facility] - [phone]  - [phone] FX     Additional details provided by patient: [facility] DID NOT FILL THE PRESCRIPTION. NOW IT NEEDS TO BE AUTHORIZED FOR THIS MONTH BY HER PCP. PLEASE AUTHORIZE AND SEND NEW PRESCRIPTION TO ABOVE PHARMACY ASAP TODAY. TWO WEEKS LATE ON THIS SHOT.    Does the patient have less than a 3 day supply:  [x] Yes  [] No    Would you like a call back once the refill request has been completed: [] Yes [] No    If the office needs to give you a call back, can they leave a voicemail: [] Yes [] No    Abdoul Preciado Rep   12/06/22 15:56 EST

## 2022-12-07 ENCOUNTER — TELEPHONE (OUTPATIENT)
Dept: FAMILY MEDICINE CLINIC | Facility: CLINIC | Age: 42
End: 2022-12-07

## 2022-12-07 RX ORDER — CYANOCOBALAMIN 1000 UG/ML
1000 INJECTION, SOLUTION INTRAMUSCULAR; SUBCUTANEOUS
Qty: 3 ML | Refills: 3 | Status: SHIPPED | OUTPATIENT
Start: 2022-12-07 | End: 2023-03-01

## 2022-12-15 RX ORDER — SPIRONOLACTONE 25 MG/1
TABLET ORAL
Qty: 30 TABLET | Refills: 0 | Status: SHIPPED | OUTPATIENT
Start: 2022-12-15 | End: 2023-02-02 | Stop reason: SDUPTHER

## 2023-01-05 ENCOUNTER — TELEPHONE (OUTPATIENT)
Dept: FAMILY MEDICINE CLINIC | Facility: CLINIC | Age: 43
End: 2023-01-05

## 2023-01-05 NOTE — TELEPHONE ENCOUNTER
CALLED PATIENT TO INFORM THEM OF NO SHOW POLICY. SHE UNDERSTAND AND RESCHEDULED APPT SHE MISSED ON 12/30/2022. PATIENT STATED SHE TRIED TO CANCEL VIA EDYTA / TEXT.

## 2023-01-30 ENCOUNTER — TELEPHONE (OUTPATIENT)
Dept: PSYCHIATRY | Facility: CLINIC | Age: 43
End: 2023-01-30
Payer: COMMERCIAL

## 2023-01-30 ENCOUNTER — OFFICE VISIT (OUTPATIENT)
Dept: FAMILY MEDICINE CLINIC | Facility: CLINIC | Age: 43
End: 2023-01-30
Payer: COMMERCIAL

## 2023-01-30 VITALS
SYSTOLIC BLOOD PRESSURE: 132 MMHG | DIASTOLIC BLOOD PRESSURE: 86 MMHG | OXYGEN SATURATION: 99 % | WEIGHT: 217.4 LBS | HEIGHT: 66 IN | HEART RATE: 83 BPM | TEMPERATURE: 97.9 F | BODY MASS INDEX: 34.94 KG/M2
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
## 2023-01-30 DIAGNOSIS — R06.02 SHORTNESS OF BREATH: ICD-10-CM

## 2023-01-30 DIAGNOSIS — E11.9 CONTROLLED TYPE 2 DIABETES MELLITUS WITHOUT COMPLICATION, WITHOUT LONG-TERM CURRENT USE OF INSULIN: ICD-10-CM

## 2023-01-30 DIAGNOSIS — E53.8 VITAMIN B12 DEFICIENCY: ICD-10-CM

## 2023-01-30 DIAGNOSIS — R06.2 WHEEZING ON AUSCULTATION: ICD-10-CM

## 2023-01-30 DIAGNOSIS — J44.9 CHRONIC OBSTRUCTIVE PULMONARY DISEASE, UNSPECIFIED COPD TYPE: ICD-10-CM

## 2023-01-30 DIAGNOSIS — I10 PRIMARY HYPERTENSION: Primary | ICD-10-CM

## 2023-01-30 DIAGNOSIS — F19.90: ICD-10-CM

## 2023-01-30 DIAGNOSIS — E55.9 VITAMIN D INSUFFICIENCY: ICD-10-CM

## 2023-01-30 DIAGNOSIS — T74.21XA SEXUAL ASSAULT OF ADULT, INITIAL ENCOUNTER: ICD-10-CM

## 2023-01-30 LAB
25(OH)D3 SERPL-MCNC: 28.3 NG/ML (ref 30–100)
ALBUMIN SERPL-MCNC: 4.7 G/DL (ref 3.5–5.2)
ALBUMIN/GLOB SERPL: 1.7 G/DL
ALP SERPL-CCNC: 91 U/L (ref 39–117)
ALT SERPL W P-5'-P-CCNC: 22 U/L (ref 1–33)
AMPHET+METHAMPHET UR QL: NEGATIVE
AMPHETAMINE INTERNAL CONTROL: ABNORMAL
AMPHETAMINES UR QL: NEGATIVE
ANION GAP SERPL CALCULATED.3IONS-SCNC: 8.9 MMOL/L (ref 5–15)
AST SERPL-CCNC: 18 U/L (ref 1–32)
BARBITURATE INTERNAL CONTROL: ABNORMAL
BARBITURATES UR QL SCN: NEGATIVE
BENZODIAZ UR QL SCN: NEGATIVE
BENZODIAZEPINE INTERNAL CONTROL: ABNORMAL
BILIRUB SERPL-MCNC: <0.2 MG/DL (ref 0–1.2)
BUN SERPL-MCNC: 12 MG/DL (ref 6–20)
BUN/CREAT SERPL: 14 (ref 7–25)
BUPRENORPHINE INTERNAL CONTROL: ABNORMAL
BUPRENORPHINE SERPL-MCNC: NEGATIVE NG/ML
C TRACH RRNA CVX QL NAA+PROBE: NOT DETECTED
CALCIUM SPEC-SCNC: 9.8 MG/DL (ref 8.6–10.5)
CANDIDA SPECIES: NEGATIVE
CANNABINOIDS SERPL QL: POSITIVE
CHLORIDE SERPL-SCNC: 99 MMOL/L (ref 98–107)
CHOLEST SERPL-MCNC: 192 MG/DL (ref 0–200)
CO2 SERPL-SCNC: 28.1 MMOL/L (ref 22–29)
COCAINE INTERNAL CONTROL: ABNORMAL
COCAINE UR QL: NEGATIVE
CREAT SERPL-MCNC: 0.86 MG/DL (ref 0.57–1)
EGFRCR SERPLBLD CKD-EPI 2021: 86.1 ML/MIN/1.73
EXPIRATION DATE: ABNORMAL
FOLATE SERPL-MCNC: 7.24 NG/ML (ref 4.78–24.2)
GARDNERELLA VAGINALIS: POSITIVE
GLOBULIN UR ELPH-MCNC: 2.8 GM/DL
GLUCOSE SERPL-MCNC: 106 MG/DL (ref 65–99)
HAV IGM SERPL QL IA: NORMAL
HBV CORE IGM SERPL QL IA: NORMAL
HBV SURFACE AG SERPL QL IA: NORMAL
HCV AB SER DONR QL: NORMAL
HDLC SERPL-MCNC: 50 MG/DL (ref 40–60)
HIV1+2 AB SER QL: NORMAL
LDLC SERPL CALC-MCNC: 109 MG/DL (ref 0–100)
LDLC/HDLC SERPL: 2.08 {RATIO}
Lab: ABNORMAL
MDMA (ECSTASY) INTERNAL CONTROL: ABNORMAL
MDMA UR QL SCN: NEGATIVE
METHADONE INTERNAL CONTROL: ABNORMAL
METHADONE UR QL SCN: NEGATIVE
METHAMPHETAMINE INTERNAL CONTROL: ABNORMAL
N GONORRHOEA RRNA SPEC QL NAA+PROBE: NOT DETECTED
OPIATES INTERNAL CONTROL: ABNORMAL
OPIATES UR QL: NEGATIVE
OXYCODONE INTERNAL CONTROL: ABNORMAL
OXYCODONE UR QL SCN: NEGATIVE
PCP UR QL SCN: NEGATIVE
PHENCYCLIDINE INTERNAL CONTROL: ABNORMAL
POTASSIUM SERPL-SCNC: 4.7 MMOL/L (ref 3.5–5.2)
PROT SERPL-MCNC: 7.5 G/DL (ref 6–8.5)
RPR SER QL: NORMAL
SODIUM SERPL-SCNC: 136 MMOL/L (ref 136–145)
T VAGINALIS DNA VAG QL PROBE+SIG AMP: POSITIVE
THC INTERNAL CONTROL: ABNORMAL
TRIGL SERPL-MCNC: 190 MG/DL (ref 0–150)
VIT B12 BLD-MCNC: 815 PG/ML (ref 211–946)
VLDLC SERPL-MCNC: 33 MG/DL (ref 5–40)

## 2023-01-30 PROCEDURE — 80305 DRUG TEST PRSMV DIR OPT OBS: CPT

## 2023-01-30 PROCEDURE — 87510 GARDNER VAG DNA DIR PROBE: CPT

## 2023-01-30 PROCEDURE — 80061 LIPID PANEL: CPT

## 2023-01-30 PROCEDURE — 87660 TRICHOMONAS VAGIN DIR PROBE: CPT

## 2023-01-30 PROCEDURE — 86592 SYPHILIS TEST NON-TREP QUAL: CPT

## 2023-01-30 PROCEDURE — 82607 VITAMIN B-12: CPT

## 2023-01-30 PROCEDURE — 36415 COLL VENOUS BLD VENIPUNCTURE: CPT

## 2023-01-30 PROCEDURE — 87591 N.GONORRHOEAE DNA AMP PROB: CPT

## 2023-01-30 PROCEDURE — G0432 EIA HIV-1/HIV-2 SCREEN: HCPCS

## 2023-01-30 PROCEDURE — 82306 VITAMIN D 25 HYDROXY: CPT

## 2023-01-30 PROCEDURE — 80074 ACUTE HEPATITIS PANEL: CPT

## 2023-01-30 PROCEDURE — 87480 CANDIDA DNA DIR PROBE: CPT

## 2023-01-30 PROCEDURE — 86695 HERPES SIMPLEX TYPE 1 TEST: CPT

## 2023-01-30 PROCEDURE — 83036 HEMOGLOBIN GLYCOSYLATED A1C: CPT

## 2023-01-30 PROCEDURE — 99214 OFFICE O/P EST MOD 30 MIN: CPT

## 2023-01-30 PROCEDURE — 80053 COMPREHEN METABOLIC PANEL: CPT

## 2023-01-30 PROCEDURE — 82746 ASSAY OF FOLIC ACID SERUM: CPT

## 2023-01-30 PROCEDURE — 85025 COMPLETE CBC W/AUTO DIFF WBC: CPT

## 2023-01-30 PROCEDURE — 87491 CHLMYD TRACH DNA AMP PROBE: CPT

## 2023-01-30 PROCEDURE — 86696 HERPES SIMPLEX TYPE 2 TEST: CPT

## 2023-01-30 RX ORDER — BUDESONIDE, GLYCOPYRROLATE, AND FORMOTEROL FUMARATE 160; 9; 4.8 UG/1; UG/1; UG/1
2 AEROSOL, METERED RESPIRATORY (INHALATION) 2 TIMES DAILY
Qty: 10.7 G | Refills: 1 | COMMUNITY
Start: 2023-01-30 | End: 2023-02-22

## 2023-01-30 RX ORDER — BUDESONIDE, GLYCOPYRROLATE, AND FORMOTEROL FUMARATE 160; 9; 4.8 UG/1; UG/1; UG/1
2 AEROSOL, METERED RESPIRATORY (INHALATION) 2 TIMES DAILY
Qty: 10.7 G | Refills: 5 | Status: SHIPPED | OUTPATIENT
Start: 2023-01-30 | End: 2023-02-22
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
## 2023-01-30 NOTE — TELEPHONE ENCOUNTER
"Pt called to request to speak with provider in regards to some medication changes \"it's quite important.\"     When asked if this was in regards to medications not working or if things have changed in her personal life, pt reported \"both.\"     After review of pt's chart no f/u on file. Pt scheduled for next availability to meet with provider in [place] office on 03/02/2023 at 1300 in-person as she lost access to her MyChart. Pt was informed that this message will still be passed along to provider for review who may either call her to discuss, request to schedule an earlier appt, or will want to wait until next scheduled appt. Pt expressed understanding.     Pt was advised to call our office back with an update on Wednesday if she doesn't hear anything back by then, to which pt confirmed and reported she'd like provider to respond back asap as she cannot wait until Wednesday and will be looking for another \"therapist\" by then. Please review and advise.   "

## 2023-01-30 NOTE — TELEPHONE ENCOUNTER
"Per provider \"Zulma Guerrero PA-C to [facility]     Patient no showed to appointment on 1/16/23, so she needs to be scheduled to discuss any issues. I have an open slot that is for new patients tomorrow morning, if that works she could be scheduled at 8:40, 9am, or 9:20am if any of those work for her, thanks!\"    Attempted to call pt to schedule for tomorrow morning if she's able.     Pt did not answer, LVM to notify we are trying to schedule earlier appt and to call our office back to schedule earlier appt.   "

## 2023-01-31 ENCOUNTER — TELEPHONE (OUTPATIENT)
Dept: PSYCHIATRY | Facility: CLINIC | Age: 43
End: 2023-01-31
Payer: COMMERCIAL

## 2023-01-31 LAB
BASOPHILS # BLD AUTO: 0.07 10*3/MM3 (ref 0–0.2)
BASOPHILS NFR BLD AUTO: 0.9 % (ref 0–1.5)
DEPRECATED RDW RBC AUTO: 39.7 FL (ref 37–54)
EOSINOPHIL # BLD AUTO: 0.23 10*3/MM3 (ref 0–0.4)
EOSINOPHIL NFR BLD AUTO: 3 % (ref 0.3–6.2)
ERYTHROCYTE [DISTWIDTH] IN BLOOD BY AUTOMATED COUNT: 12.2 % (ref 12.3–15.4)
HBA1C MFR BLD: 6 % (ref 4.8–5.6)
HCT VFR BLD AUTO: 46.1 % (ref 34–46.6)
HGB BLD-MCNC: 15.3 G/DL (ref 12–15.9)
IMM GRANULOCYTES # BLD AUTO: 0.05 10*3/MM3 (ref 0–0.05)
IMM GRANULOCYTES NFR BLD AUTO: 0.6 % (ref 0–0.5)
LYMPHOCYTES # BLD AUTO: 1.9 10*3/MM3 (ref 0.7–3.1)
LYMPHOCYTES NFR BLD AUTO: 24.6 % (ref 19.6–45.3)
MCH RBC QN AUTO: 30.2 PG (ref 26.6–33)
MCHC RBC AUTO-ENTMCNC: 33.2 G/DL (ref 31.5–35.7)
MCV RBC AUTO: 91.1 FL (ref 79–97)
MONOCYTES # BLD AUTO: 0.38 10*3/MM3 (ref 0.1–0.9)
MONOCYTES NFR BLD AUTO: 4.9 % (ref 5–12)
NEUTROPHILS NFR BLD AUTO: 5.09 10*3/MM3 (ref 1.7–7)
NEUTROPHILS NFR BLD AUTO: 66 % (ref 42.7–76)
NRBC BLD AUTO-RTO: 0 /100 WBC (ref 0–0.2)
PLATELET # BLD AUTO: 350 10*3/MM3 (ref 140–450)
PMV BLD AUTO: 10.1 FL (ref 6–12)
RBC # BLD AUTO: 5.06 10*6/MM3 (ref 3.77–5.28)
WBC NRBC COR # BLD: 7.72 10*3/MM3 (ref 3.4–10.8)

## 2023-02-01 ENCOUNTER — TELEPHONE (OUTPATIENT)
Dept: FAMILY MEDICINE CLINIC | Facility: CLINIC | Age: 43
End: 2023-02-01
Payer: COMMERCIAL

## 2023-02-01 DIAGNOSIS — B96.89 BACTERIAL VAGINOSIS: Primary | ICD-10-CM

## 2023-02-01 DIAGNOSIS — N76.0 BACTERIAL VAGINOSIS: Primary | ICD-10-CM

## 2023-02-01 DIAGNOSIS — A59.9 TRICHOMONAS INFECTION: ICD-10-CM

## 2023-02-01 LAB
HSV1 IGG SER IA-ACNC: 9.73 INDEX (ref 0–0.9)
HSV2 IGG SER IA-ACNC: <0.91 INDEX (ref 0–0.9)

## 2023-02-01 RX ORDER — METRONIDAZOLE 500 MG/1
500 TABLET ORAL 2 TIMES DAILY
Qty: 14 TABLET | Refills: 0 | Status: SHIPPED | OUTPATIENT
Start: 2023-02-01 | End: 2023-02-08

## 2023-02-01 NOTE — TELEPHONE ENCOUNTER
Caller: Nadiya Rodríguez    Relationship: Self    Best call back number: [phone]    What test was performed: STD TEST/BLOOD WORK    Additional notes: PLEASE CALL WITH THESE RESULTS.

## 2023-02-01 NOTE — TELEPHONE ENCOUNTER
Spoke to patient and relayed results per provider. She stated that she understood. Patient also got scheduled for a one month follow up.

## 2023-02-02 NOTE — TELEPHONE ENCOUNTER
Caller: Nadiya Rodríguez    Relationship: Self    Best call back number: [phone]    Requested Prescriptions:   Requested Prescriptions     Pending Prescriptions Disp Refills   • spironolactone (ALDACTONE) 25 MG tablet 30 tablet 0     Sig: Take 1 tablet by mouth Daily.        Pharmacy where request should be sent: [facility] - [place], [place] - [address] [facility] - [phone]  - [phone] FX     Additional details provided by patient: PATIENT IS COMPLETELY OUT OF THE MEDICATION. PATIENT FORGOT TO MENTION AT LAST APPOINTMENT. SHE WOULD LIKE TO HAVE A 90 DAY SUPPLY CALLED IN INSTEAD OF 30 DAY SUPPLY.    Does the patient have less than a 3 day supply:  [x] Yes  [] No    Would you like a call back once the refill request has been completed: [] Yes [x] No    If the office needs to give you a call back, can they leave a voicemail: [x] Yes [] No    Abdoul Weaver Rep   02/02/23 14:36 EST

## 2023-02-03 ENCOUNTER — TELEPHONE (OUTPATIENT)
Dept: FAMILY MEDICINE CLINIC | Facility: CLINIC | Age: 43
End: 2023-02-03

## 2023-02-03 RX ORDER — SPIRONOLACTONE 25 MG/1
25 TABLET ORAL DAILY
Qty: 90 TABLET | Refills: 0 | Status: SHIPPED | OUTPATIENT
Start: 2023-02-03

## 2023-02-03 NOTE — TELEPHONE ENCOUNTER
Needing refills on her water pill. She has been out for 3 days.   She is also asking for a 90 day refill on ALL of her medications. She is [place] but is keeping [facility] as her provider

## 2023-02-07 ENCOUNTER — OFFICE VISIT (OUTPATIENT)
Dept: BEHAVIORAL HEALTH | Facility: CLINIC | Age: 43
End: 2023-02-07
Payer: COMMERCIAL

## 2023-02-07 VITALS
HEART RATE: 81 BPM | WEIGHT: 261 LBS | BODY MASS INDEX: 42.13 KG/M2 | DIASTOLIC BLOOD PRESSURE: 77 MMHG | SYSTOLIC BLOOD PRESSURE: 133 MMHG

## 2023-02-07 DIAGNOSIS — F43.23 ADJUSTMENT DISORDER WITH MIXED ANXIETY AND DEPRESSED MOOD: Primary | ICD-10-CM

## 2023-02-07 DIAGNOSIS — F41.1 GENERALIZED ANXIETY DISORDER: ICD-10-CM

## 2023-02-07 DIAGNOSIS — F33.1 MODERATE EPISODE OF RECURRENT MAJOR DEPRESSIVE DISORDER: ICD-10-CM

## 2023-02-07 DIAGNOSIS — G47.00 INSOMNIA, UNSPECIFIED TYPE: ICD-10-CM

## 2023-02-07 DIAGNOSIS — F51.5 NIGHTMARES: ICD-10-CM

## 2023-02-07 PROCEDURE — 90833 PSYTX W PT W E/M 30 MIN: CPT | Performed by: PHYSICIAN ASSISTANT

## 2023-02-07 PROCEDURE — 99214 OFFICE O/P EST MOD 30 MIN: CPT | Performed by: PHYSICIAN ASSISTANT

## 2023-02-07 RX ORDER — CLONIDINE HYDROCHLORIDE 0.1 MG/1
0.1 TABLET ORAL
Qty: 30 TABLET | Refills: 0 | Status: SHIPPED | OUTPATIENT
Start: 2023-02-07 | End: 2023-02-22

## 2023-02-07 NOTE — PROGRESS NOTES
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 Diagnoses:    ICD-10-CM ICD-9-CM   1. Adjustment disorder with mixed anxiety and depressed mood  F43.23 309.28   2. Moderate episode of recurrent major depressive disorder (HCC)  F33.1 296.32   3. Generalized anxiety disorder  F41.1 300.02   4. Nightmares  F51.5 307.47   5. Insomnia, unspecified type  G47.00 780.52       PLAN:  1. Safety: No acute safety concerns at this time.  2. Therapy: Continue therapy with Yesenia Ling LCSW  3. Risk Assessment: Risk of self-harm acutely is moderate to severe.  Risk factors include anxiety disorder, mood disorder, family history of daughter with suicide attempt, access to guns, h/o suicide attempt and self harm in the past, AODA, and recent psychosocial stressors (pandemic). Protective factors include no present SI, healthcare seeking, future orientation, willingness to engage in care.  Risk of self-harm chronically is also moderate to severe, but could be further elevated in the event of treatment noncompliance and/or AODA.  4. Labs/Diagnostics Ordered:   Orders Placed This Encounter   Procedures   • Ambulatory Referral to Psychotherapy     5. Medications:   New Medications Ordered This Visit   Medications   • cloNIDine (Catapres) 0.1 MG tablet     Sig: Take 1 tablet by mouth every night at bedtime for 30 days.     Dispense:  30 tablet     Refill:  0      Clonidine, Risks, benefits, alternatives discussed with patient including dizziness, sedation, falls, low blood pressure, GI upset.  After discussion of these risks and benefits, the patient voiced understanding and agreed to proceed.    6. Follow up:   F/u in 2 weeks.    TREATMENT PLAN/GOALS: Continue supportive psychotherapy efforts and medications as indicated. Treatment and medication options discussed during today's visit. Patient ackowledged and verbally consented to continue with current treatment plan and was educated on the importance of compliance with treatment and follow-up appointments.    MEDICATION ISSUES:  CAMMY reviewed as expected.  Discussed medication options and treatment plan of prescribed medication as well as the risks, benefits, and side effects including potential falls, possible impaired driving and metabolic adversities among others. Patient is agreeable to call the office with any worsening of symptoms or onset of side effects. Patient is agreeable to call 911 or go to the nearest ER should he/she begin having SI/HI. No medication side effects or related complaints today.     16 minutes of supportive psychotherapy with goal to strengthen defenses, promote problems solving, restore adaptive functioning and provide symptom relief. The therapeutic alliance was strengthened to encourage the patient to express their thoughts and feelings. Esteem building was enhanced through praise, reassurance, normalizing and encouragement. Coping skills were enhanced to build distress tolerance skills and emotional regulation. Allowed patient to freely discuss issues without interruption or judgement with unconditional positive regard, active listening skills, and empathy. Provided a safe, confidential environment to facilitate the development of a positive therapeutic relationship and encourage open, honest communication. Assisted patient in identifying risk factors which would indicate the need for  higher level of care including thoughts to harm self or others and/or self-harming behavior and encouraged patient to contact this office, call 911, or present to the nearest emergency room should any of these events occur. Assisted patient in processing session content; acknowledged and normalized patient's thoughts, feelings, and concerns by utilizing a person-centered approach in efforts to build appropriate rapport and a positive therapeutic relationship with open and honest communication. Patient given education on medication side effects, diagnosis/illness and relapse symptoms. Plan to continue supportive psychotherapy in next appointment to provide symptom relief. 4 weeks     Diagnoses: as above  Symptoms: as above  Functional status: good  Mental Status Exam: as above    Treatment plan: medication management and supportive psychotherapy  Prognosis: good  Progress: continued improvement         This document has been electronically signed by Zulma Guerrero PA-C  February 7, 2023 13:00 EST      Part of this note may be an electronic transcription/translation of spoken language to printed text using the Dragon Dictation System.

## 2023-02-15 ENCOUNTER — TELEPHONE (OUTPATIENT)
Dept: FAMILY MEDICINE CLINIC | Facility: CLINIC | Age: 43
End: 2023-02-15

## 2023-02-15 NOTE — TELEPHONE ENCOUNTER
HUB TO READ  Called PT to reschedule appt. Pt stated she was not home and would call back to make appt.

## 2023-02-16 ENCOUNTER — TELEPHONE (OUTPATIENT)
Dept: PSYCHIATRY | Facility: CLINIC | Age: 43
End: 2023-02-16
Payer: COMMERCIAL

## 2023-02-16 NOTE — TELEPHONE ENCOUNTER
Pt has a question about her clonidine.  Seems like it is making her night terrors worse and more frequent .  Patient says that she is now having a lot of problems getting any sleep

## 2023-02-22 ENCOUNTER — TELEMEDICINE (OUTPATIENT)
Dept: PSYCHIATRY | Facility: CLINIC | Age: 43
End: 2023-02-22
Payer: COMMERCIAL

## 2023-02-22 DIAGNOSIS — F41.1 GENERALIZED ANXIETY DISORDER: ICD-10-CM

## 2023-02-22 DIAGNOSIS — F51.5 NIGHTMARES: ICD-10-CM

## 2023-02-22 DIAGNOSIS — F33.2 SEVERE EPISODE OF RECURRENT MAJOR DEPRESSIVE DISORDER, WITHOUT PSYCHOTIC FEATURES: ICD-10-CM

## 2023-02-22 DIAGNOSIS — G47.00 INSOMNIA, UNSPECIFIED TYPE: ICD-10-CM

## 2023-02-22 DIAGNOSIS — F43.10 POST TRAUMATIC STRESS DISORDER (PTSD): ICD-10-CM

## 2023-02-22 DIAGNOSIS — F43.23 ADJUSTMENT DISORDER WITH MIXED ANXIETY AND DEPRESSED MOOD: Primary | ICD-10-CM

## 2023-02-22 PROCEDURE — 99214 OFFICE O/P EST MOD 30 MIN: CPT | Performed by: PHYSICIAN ASSISTANT

## 2023-02-22 PROCEDURE — 90833 PSYTX W PT W E/M 30 MIN: CPT | Performed by: PHYSICIAN ASSISTANT

## 2023-02-22 RX ORDER — VENLAFAXINE HYDROCHLORIDE 37.5 MG/1
CAPSULE, EXTENDED RELEASE ORAL
Qty: 60 CAPSULE | Refills: 1 | Status: SHIPPED | OUTPATIENT
Start: 2023-02-22 | End: 2023-03-14

## 2023-02-22 RX ORDER — TRAZODONE HYDROCHLORIDE 100 MG/1
TABLET ORAL
Qty: 45 TABLET | Refills: 1 | Status: SHIPPED | OUTPATIENT
Start: 2023-02-22 | End: 2023-03-14 | Stop reason: SDUPTHER

## 2023-02-22 NOTE — PROGRESS NOTES
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 increase to 2 cap PO QHS  Dispense: 60 capsule; Refill: 1    2. Post traumatic stress disorder (PTSD)  -     venlafaxine XR (Effexor XR) 37.5 MG 24 hr capsule; Take 1 cap PO QHS x 2 weeks, if tolerated can increase to 2 cap PO QHS  Dispense: 60 capsule; Refill: 1    3. Generalized anxiety disorder  -     venlafaxine XR (Effexor XR) 37.5 MG 24 hr capsule; Take 1 cap PO QHS x 2 weeks, if tolerated can increase to 2 cap PO QHS  Dispense: 60 capsule; Refill: 1    4. Severe episode of recurrent major depressive disorder, without psychotic features (HCC)  -     venlafaxine XR (Effexor XR) 37.5 MG 24 hr capsule; Take 1 cap PO QHS x 2 weeks, if tolerated can increase to 2 cap PO QHS  Dispense: 60 capsule; Refill: 1    5. Insomnia, unspecified type  -     traZODone (DESYREL) 100 MG tablet; Take 0.5 to 1.5 tab PO QHS PRN sleep  Dispense: 45 tablet; Refill: 1    6. Nightmares  -     traZODone (DESYREL) 100 MG tablet; Take 0.5 to 1.5 tab PO QHS PRN sleep  Dispense: 45 tablet; Refill: 1    Presentation seems most consistent with adjustment disorder, MDD, ARRON, nightmares, insomnia.  Will start on effexor for management of depression, anxiety, and overall mood. Will start on trazodone for sleep and nightmares. Keep upcoming appointment for shannen today. F/u in 3 weeks. Addressed all  questions and concerns.      Visit Diagnoses:    ICD-10-CM ICD-9-CM   1. Adjustment disorder with mixed anxiety and depressed mood  F43.23 309.28   2. Post traumatic stress disorder (PTSD)  F43.10 309.81   3. Generalized anxiety disorder  F41.1 300.02   4. Severe episode of recurrent major depressive disorder, without psychotic features (HCC)  F33.2 296.33   5. Insomnia, unspecified type  G47.00 780.52   6. Nightmares  F51.5 307.47       PLAN:  1. Safety: No acute safety concerns at this time.  2. Therapy: Continue therapy with Yesenia Ling LCSW  3. Risk Assessment: Risk of self-harm acutely is moderate to severe.  Risk factors include anxiety disorder, mood disorder, family history of daughter with suicide attempt, access to guns, h/o suicide attempt and self harm in the past, AODA, and recent psychosocial stressors (pandemic). Protective factors include no present SI, healthcare seeking, future orientation, willingness to engage in care.  Risk of self-harm chronically is also moderate to severe, but could be further elevated in the event of treatment noncompliance and/or AODA.  4. Labs/Diagnostics Ordered:   No orders of the defined types were placed in this encounter.    5. Medications:   New Medications Ordered This Visit   Medications   • venlafaxine XR (Effexor XR) 37.5 MG 24 hr capsule     Sig: Take 1 cap PO QHS x 2 weeks, if tolerated can increase to 2 cap PO QHS     Dispense:  60 capsule     Refill:  1   • traZODone (DESYREL) 100 MG tablet     Sig: Take 0.5 to 1.5 tab PO QHS PRN sleep     Dispense:  45 tablet     Refill:  1       Discussed all risks, benefits, alternatives, and side effects of Venlafaxine including but not limited to GI upset, sexual dysfunction, bleeding risk, seizure risk, insomnia, diaphoresis, weight loss, dizziness, drowsiness, asthenia, activation of hong or hypomania, increased fragility fracture risk, hyponatremia, increased BP, hepatotoxicity, ocular effects, withdrawal  syndrome following abrupt discontinuation, serotonin syndrome, and activation of suicidal ideation and behavior.  Pt educated on the need to practice safe sex while taking this med. Discussed the need for pt to immediately call the office for any new or worsening symptoms, such as worsening depression; feeling nervous or restless; suicidal thoughts or actions; or other changes changes in mood or behavior, and all other concerns. Pt educated on med compliance and the risks of suddenly stopping this medication or missing doses. Pt verbalized understanding and is agreeable to taking Venlafaxine. Addressed all questions and concerns.     Discussed all risks, benefits, alternatives, and side effects of Trazodone including but not limited to GI upset, sexual dysfunction, dizziness, headache, nervousness, bleeding risk, seizure risk, sedation, headache, activation of hong or hypomania, increased fragility fracture risk, cardiac arrhythmias, priapism, hyponatremia, ocular effects, prolonged QT interval, withdrawal syndrome following abrupt discontinuation, serotonin syndrome, and activation of suicidal ideation and behavior. Pt educated on the need to practice safe sex while taking this med. Discussed the need for pt to immediately call the office for any new or worsening symptoms, such as worsening depression; feeling nervous or restless; suicidal thoughts or actions; or other changes changes in mood or behavior, and all other concerns. Pt educated on med compliance and the risks of suddenly stopping this medication or missing doses. Pt verbalized understanding and is agreeable to taking Trazodone. Addressed all questions and concerns.     6. Follow up:   F/u in 3 weeks.    TREATMENT PLAN/GOALS: Continue supportive psychotherapy efforts and medications as indicated. Treatment and medication options discussed during today's visit. Patient ackowledged and verbally consented to continue with current treatment plan and was  educated on the importance of compliance with treatment and follow-up appointments.    MEDICATION ISSUES:  CAMMY reviewed as expected.  Discussed medication options and treatment plan of prescribed medication as well as the risks, benefits, and side effects including potential falls, possible impaired driving and metabolic adversities among others. Patient is agreeable to call the office with any worsening of symptoms or onset of side effects. Patient is agreeable to call 911 or go to the nearest ER should he/she begin having SI/HI. No medication side effects or related complaints today.     20 minutes of supportive psychotherapy with goal to strengthen defenses, promote problems solving, restore adaptive functioning and provide symptom relief. The therapeutic alliance was strengthened to encourage the patient to express their thoughts and feelings. Esteem building was enhanced through praise, reassurance, normalizing and encouragement. Coping skills were enhanced to build distress tolerance skills and emotional regulation. Allowed patient to freely discuss issues without interruption or judgement with unconditional positive regard, active listening skills, and empathy. Provided a safe, confidential environment to facilitate the development of a positive therapeutic relationship and encourage open, honest communication. Assisted patient in identifying risk factors which would indicate the need for higher level of care including thoughts to harm self or others and/or self-harming behavior and encouraged patient to contact this office, call 911, or present to the nearest emergency room should any of these events occur. Assisted patient in processing session content; acknowledged and normalized patient's thoughts, feelings, and concerns by utilizing a person-centered approach in efforts to build appropriate rapport and a positive therapeutic relationship with open and honest communication. Patient given education on  medication side effects, diagnosis/illness and relapse symptoms. Plan to continue supportive psychotherapy in next appointment to provide symptom relief. 4 weeks     Diagnoses: as above  Symptoms: as above  Functional status: good  Mental Status Exam: as above    Treatment plan: medication management and supportive psychotherapy  Prognosis: good  Progress: continued improvement         This document has been electronically signed by Zulma Guerrero PA-C  February 22, 2023 08:38 EST      Part of this note may be an electronic transcription/translation of spoken language to printed text using the Dragon Dictation System.

## 2023-03-01 ENCOUNTER — OFFICE VISIT (OUTPATIENT)
Dept: FAMILY MEDICINE CLINIC | Facility: CLINIC | Age: 43
End: 2023-03-01
Payer: COMMERCIAL

## 2023-03-01 ENCOUNTER — TELEPHONE (OUTPATIENT)
Dept: PSYCHIATRY | Facility: CLINIC | Age: 43
End: 2023-03-01
Payer: COMMERCIAL

## 2023-03-01 VITALS
TEMPERATURE: 97.7 F | WEIGHT: 225.1 LBS | HEIGHT: 66 IN | SYSTOLIC BLOOD PRESSURE: 126 MMHG | OXYGEN SATURATION: 99 % | HEART RATE: 70 BPM | BODY MASS INDEX: 36.17 KG/M2 | DIASTOLIC BLOOD PRESSURE: 82 MMHG

## 2023-03-01 DIAGNOSIS — G89.29 CHRONIC BILATERAL LOW BACK PAIN WITH BILATERAL SCIATICA: ICD-10-CM

## 2023-03-01 DIAGNOSIS — B96.89 BACTERIAL VAGINOSIS: ICD-10-CM

## 2023-03-01 DIAGNOSIS — M54.41 CHRONIC BILATERAL LOW BACK PAIN WITH BILATERAL SCIATICA: ICD-10-CM

## 2023-03-01 DIAGNOSIS — A59.9 TRICHOMONAS INFECTION: Primary | ICD-10-CM

## 2023-03-01 DIAGNOSIS — N76.0 BACTERIAL VAGINOSIS: ICD-10-CM

## 2023-03-01 DIAGNOSIS — M54.42 CHRONIC BILATERAL LOW BACK PAIN WITH BILATERAL SCIATICA: ICD-10-CM

## 2023-03-01 DIAGNOSIS — N76.0 BACTERIAL VAGINOSIS: Primary | ICD-10-CM

## 2023-03-01 DIAGNOSIS — B96.89 BACTERIAL VAGINOSIS: Primary | ICD-10-CM

## 2023-03-01 LAB
CANDIDA SPECIES: NEGATIVE
GARDNERELLA VAGINALIS: POSITIVE
T VAGINALIS DNA VAG QL PROBE+SIG AMP: NEGATIVE

## 2023-03-01 PROCEDURE — 87510 GARDNER VAG DNA DIR PROBE: CPT

## 2023-03-01 PROCEDURE — 87660 TRICHOMONAS VAGIN DIR PROBE: CPT

## 2023-03-01 PROCEDURE — 87480 CANDIDA DNA DIR PROBE: CPT

## 2023-03-01 PROCEDURE — 99214 OFFICE O/P EST MOD 30 MIN: CPT

## 2023-03-01 RX ORDER — METRONIDAZOLE 500 MG/1
500 TABLET ORAL 2 TIMES DAILY
Qty: 14 TABLET | Refills: 0 | Status: SHIPPED | OUTPATIENT
Start: 2023-03-01 | End: 2023-03-08

## 2023-03-01 RX ORDER — TIZANIDINE 4 MG/1
4 TABLET ORAL EVERY 6 HOURS PRN
Qty: 90 TABLET | Refills: 0 | Status: SHIPPED | OUTPATIENT
Start: 2023-03-01

## 2023-03-01 NOTE — TELEPHONE ENCOUNTER
PATIENT CALLED AND STATED THAT THEY DID NOT GET THE CHANCE TO START HER NEW MEDICATION.    PATIENT STATED THAT SHE WANTED TO CANCEL TOMORROWS APPOINTMENT AND RESCHEDULE.     PATIENT WAS INFORMED THAT SHE HAS ONE ON 3/14/2023. PATIENT STATED THAT SHE WAS OKAY TO KEEP THAT APPOINTMENT AND SHE WOULD SCHEDULE A NEW ONE AFTER THAT VISIT.
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
## 2023-03-14 ENCOUNTER — OFFICE VISIT (OUTPATIENT)
Dept: BEHAVIORAL HEALTH | Facility: CLINIC | Age: 43
End: 2023-03-14
Payer: COMMERCIAL

## 2023-03-14 VITALS
DIASTOLIC BLOOD PRESSURE: 78 MMHG | SYSTOLIC BLOOD PRESSURE: 107 MMHG | HEIGHT: 66 IN | BODY MASS INDEX: 37.09 KG/M2 | HEART RATE: 73 BPM | WEIGHT: 230.8 LBS

## 2023-03-14 DIAGNOSIS — F43.23 ADJUSTMENT DISORDER WITH MIXED ANXIETY AND DEPRESSED MOOD: Primary | ICD-10-CM

## 2023-03-14 DIAGNOSIS — F41.1 GENERALIZED ANXIETY DISORDER: ICD-10-CM

## 2023-03-14 DIAGNOSIS — F51.5 NIGHTMARES: ICD-10-CM

## 2023-03-14 DIAGNOSIS — G47.00 INSOMNIA, UNSPECIFIED TYPE: ICD-10-CM

## 2023-03-14 DIAGNOSIS — F33.1 MODERATE EPISODE OF RECURRENT MAJOR DEPRESSIVE DISORDER: ICD-10-CM

## 2023-03-14 PROCEDURE — 99213 OFFICE O/P EST LOW 20 MIN: CPT | Performed by: PHYSICIAN ASSISTANT

## 2023-03-14 RX ORDER — TRAZODONE HYDROCHLORIDE 100 MG/1
200 TABLET ORAL NIGHTLY
Qty: 60 TABLET | Refills: 2 | Status: SHIPPED | OUTPATIENT
Start: 2023-03-14 | End: 2023-03-15 | Stop reason: SDUPTHER

## 2023-03-14 NOTE — PROGRESS NOTES
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 could be further elevated in the event of treatment noncompliance and/or AODA.  4. Labs/Diagnostics Ordered:   No orders of the defined types were placed in this encounter.    5. Medications:   New Medications Ordered This Visit   Medications   • traZODone (DESYREL) 100 MG tablet     Sig: Take 2 tablets by mouth Every Night for 30 days. Take 0.5 to 1.5 tab PO QHS PRN sleep     Dispense:  60 tablet     Refill:  2       Discussed all risks, benefits, alternatives, and side effects of Trazodone including but not limited to GI upset, sexual dysfunction, dizziness, headache, nervousness, bleeding risk, seizure risk, sedation, headache, activation of hong or hypomania, increased fragility fracture risk, cardiac arrhythmias, priapism, hyponatremia, ocular effects, prolonged QT interval, withdrawal syndrome following abrupt discontinuation, serotonin syndrome, and activation of suicidal ideation and behavior. Pt educated on the need to practice safe sex while taking this med. Discussed the need for pt to immediately call the office for any new or worsening symptoms, such as worsening depression; feeling nervous or restless; suicidal thoughts or actions; or other changes changes in mood or behavior, and all other concerns. Pt educated on med compliance and the risks of  suddenly stopping this medication or missing doses. Pt verbalized understanding and is agreeable to taking Trazodone. Addressed all questions and concerns.     6. Follow up:   F/u in 1 month    TREATMENT PLAN/GOALS: Continue supportive psychotherapy efforts and medications as indicated. Treatment and medication options discussed during today's visit. Patient ackowledged and verbally consented to continue with current treatment plan and was educated on the importance of compliance with treatment and follow-up appointments.    MEDICATION ISSUES:  CAMMY reviewed as expected.  Discussed medication options and treatment plan of prescribed medication as well as the risks, benefits, and side effects including potential falls, possible impaired driving and metabolic adversities among others. Patient is agreeable to call the office with any worsening of symptoms or onset of side effects. Patient is agreeable to call 911 or go to the nearest ER should he/she begin having SI/HI. No medication side effects or related complaints today.            This document has been electronically signed by Zulma Guerrero PA-C  March 14, 2023 10:45 EDT      Part of this note may be an electronic transcription/translation of spoken language to printed text using the Dragon Dictation System.

## 2023-03-15 DIAGNOSIS — F51.5 NIGHTMARES: ICD-10-CM

## 2023-03-15 DIAGNOSIS — G47.00 INSOMNIA, UNSPECIFIED TYPE: ICD-10-CM

## 2023-03-15 RX ORDER — TRAZODONE HYDROCHLORIDE 100 MG/1
200 TABLET ORAL NIGHTLY
Qty: 60 TABLET | Refills: 2 | Status: SHIPPED | OUTPATIENT
Start: 2023-03-15 | End: 2023-04-14

## 2023-03-15 NOTE — TELEPHONE ENCOUNTER
Pharmacy sent a clarification request for pt's trazodone instructions.  Please rewrite instructions and resend

## 2023-03-17 ENCOUNTER — OFFICE VISIT (OUTPATIENT)
Dept: FAMILY MEDICINE CLINIC | Facility: CLINIC | Age: 43
End: 2023-03-17
Payer: COMMERCIAL

## 2023-03-17 VITALS
HEART RATE: 69 BPM | OXYGEN SATURATION: 99 % | HEIGHT: 66 IN | TEMPERATURE: 97.5 F | WEIGHT: 227.4 LBS | DIASTOLIC BLOOD PRESSURE: 68 MMHG | SYSTOLIC BLOOD PRESSURE: 110 MMHG | BODY MASS INDEX: 36.55 KG/M2

## 2023-03-17 DIAGNOSIS — R05.1 ACUTE COUGH: ICD-10-CM

## 2023-03-17 DIAGNOSIS — J06.9 UPPER RESPIRATORY TRACT INFECTION, UNSPECIFIED TYPE: ICD-10-CM

## 2023-03-17 DIAGNOSIS — R19.7 DIARRHEA, UNSPECIFIED TYPE: ICD-10-CM

## 2023-03-17 DIAGNOSIS — J02.9 SORE THROAT: Primary | ICD-10-CM

## 2023-03-17 DIAGNOSIS — J45.40 MODERATE PERSISTENT ASTHMA WITHOUT COMPLICATION: ICD-10-CM

## 2023-03-17 DIAGNOSIS — R06.2 WHEEZING: ICD-10-CM

## 2023-03-17 LAB
EXPIRATION DATE: NORMAL
EXPIRATION DATE: NORMAL
FLUAV AG UPPER RESP QL IA.RAPID: NOT DETECTED
FLUBV AG UPPER RESP QL IA.RAPID: NOT DETECTED
INTERNAL CONTROL: NORMAL
INTERNAL CONTROL: NORMAL
Lab: NORMAL
Lab: NORMAL
S PYO AG THROAT QL: NEGATIVE
SARS-COV-2 AG UPPER RESP QL IA.RAPID: NOT DETECTED

## 2023-03-17 PROCEDURE — 87428 SARSCOV & INF VIR A&B AG IA: CPT

## 2023-03-17 PROCEDURE — 99214 OFFICE O/P EST MOD 30 MIN: CPT

## 2023-03-17 PROCEDURE — 87880 STREP A ASSAY W/OPTIC: CPT

## 2023-03-17 RX ORDER — ALBUTEROL SULFATE 90 UG/1
2 AEROSOL, METERED RESPIRATORY (INHALATION) EVERY 4 HOURS PRN
Qty: 18 G | Refills: 2 | Status: SHIPPED | OUTPATIENT
Start: 2023-03-17

## 2023-03-17 RX ORDER — METOPROLOL TARTRATE 100 MG/1
100 TABLET ORAL DAILY
Qty: 90 TABLET | Refills: 0 | Status: SHIPPED | OUTPATIENT
Start: 2023-03-17

## 2023-03-17 RX ORDER — AZITHROMYCIN 250 MG/1
TABLET, FILM COATED ORAL
Qty: 6 TABLET | Refills: 0 | Status: SHIPPED | OUTPATIENT
Start: 2023-03-17

## 2023-03-17 RX ORDER — PREDNISONE 20 MG/1
40 TABLET ORAL DAILY
Qty: 10 TABLET | Refills: 0 | Status: SHIPPED | OUTPATIENT
Start: 2023-03-17 | End: 2023-03-22

## 2023-03-17 RX ORDER — FLUTICASONE PROPIONATE AND SALMETEROL 250; 50 UG/1; UG/1
1 POWDER RESPIRATORY (INHALATION)
Qty: 60 EACH | Refills: 5 | Status: SHIPPED | OUTPATIENT
Start: 2023-03-17
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
## 2023-03-31 ENCOUNTER — HOSPITAL ENCOUNTER (EMERGENCY)
Facility: HOSPITAL | Age: 43
Discharge: HOME | End: 2023-04-01
Attending: EMERGENCY MEDICINE | Admitting: EMERGENCY MEDICINE
Payer: COMMERCIAL

## 2023-03-31 ENCOUNTER — APPOINTMENT (OUTPATIENT)
Dept: GENERAL RADIOLOGY | Facility: HOSPITAL | Age: 43
End: 2023-03-31
Payer: COMMERCIAL

## 2023-03-31 DIAGNOSIS — G43.109 MIGRAINE WITH AURA AND WITHOUT STATUS MIGRAINOSUS, NOT INTRACTABLE: Primary | ICD-10-CM

## 2023-03-31 DIAGNOSIS — R20.2 PARESTHESIA OF LEFT ARM AND LEG: ICD-10-CM

## 2023-03-31 LAB
ALBUMIN SERPL-MCNC: 4 G/DL (ref 3.5–5.2)
ALBUMIN/GLOB SERPL: 1.5 G/DL
ALP SERPL-CCNC: 74 U/L (ref 39–117)
ALT SERPL W P-5'-P-CCNC: 15 U/L (ref 1–33)
ANION GAP SERPL CALCULATED.3IONS-SCNC: 12 MMOL/L (ref 5–15)
AST SERPL-CCNC: 16 U/L (ref 1–32)
BASOPHILS # BLD AUTO: 0.06 10*3/MM3 (ref 0–0.2)
BASOPHILS NFR BLD AUTO: 0.6 % (ref 0–1.5)
BILIRUB SERPL-MCNC: 0.3 MG/DL (ref 0–1.2)
BUN SERPL-MCNC: 11 MG/DL (ref 6–20)
BUN/CREAT SERPL: 14.7 (ref 7–25)
CALCIUM SPEC-SCNC: 9.2 MG/DL (ref 8.6–10.5)
CHLORIDE SERPL-SCNC: 103 MMOL/L (ref 98–107)
CO2 SERPL-SCNC: 23 MMOL/L (ref 22–29)
CREAT SERPL-MCNC: 0.75 MG/DL (ref 0.57–1)
DEPRECATED RDW RBC AUTO: 46.1 FL (ref 37–54)
EGFRCR SERPLBLD CKD-EPI 2021: 101.5 ML/MIN/1.73
EOSINOPHIL # BLD AUTO: 0.32 10*3/MM3 (ref 0–0.4)
EOSINOPHIL NFR BLD AUTO: 3.2 % (ref 0.3–6.2)
ERYTHROCYTE [DISTWIDTH] IN BLOOD BY AUTOMATED COUNT: 13.7 % (ref 12.3–15.4)
GLOBULIN UR ELPH-MCNC: 2.6 GM/DL
GLUCOSE SERPL-MCNC: 123 MG/DL (ref 65–99)
HCT VFR BLD AUTO: 42.6 % (ref 34–46.6)
HGB BLD-MCNC: 14.3 G/DL (ref 12–15.9)
HOLD SPECIMEN: NORMAL
HOLD SPECIMEN: NORMAL
IMM GRANULOCYTES # BLD AUTO: 0.04 10*3/MM3 (ref 0–0.05)
IMM GRANULOCYTES NFR BLD AUTO: 0.4 % (ref 0–0.5)
LYMPHOCYTES # BLD AUTO: 2.92 10*3/MM3 (ref 0.7–3.1)
LYMPHOCYTES NFR BLD AUTO: 29.6 % (ref 19.6–45.3)
MAGNESIUM SERPL-MCNC: 1.5 MG/DL (ref 1.6–2.6)
MCH RBC QN AUTO: 30.6 PG (ref 26.6–33)
MCHC RBC AUTO-ENTMCNC: 33.6 G/DL (ref 31.5–35.7)
MCV RBC AUTO: 91 FL (ref 79–97)
MONOCYTES # BLD AUTO: 0.52 10*3/MM3 (ref 0.1–0.9)
MONOCYTES NFR BLD AUTO: 5.3 % (ref 5–12)
NEUTROPHILS NFR BLD AUTO: 6 10*3/MM3 (ref 1.7–7)
NEUTROPHILS NFR BLD AUTO: 60.9 % (ref 42.7–76)
NRBC BLD AUTO-RTO: 0 /100 WBC (ref 0–0.2)
PLATELET # BLD AUTO: 297 10*3/MM3 (ref 140–450)
PMV BLD AUTO: 9.8 FL (ref 6–12)
POTASSIUM SERPL-SCNC: 3.6 MMOL/L (ref 3.5–5.2)
PROT SERPL-MCNC: 6.6 G/DL (ref 6–8.5)
RBC # BLD AUTO: 4.68 10*6/MM3 (ref 3.77–5.28)
SODIUM SERPL-SCNC: 138 MMOL/L (ref 136–145)
TROPONIN T SERPL HS-MCNC: <6 NG/L
WBC NRBC COR # BLD: 9.86 10*3/MM3 (ref 3.4–10.8)
WHOLE BLOOD HOLD COAG: NORMAL
WHOLE BLOOD HOLD SPECIMEN: NORMAL

## 2023-03-31 PROCEDURE — 83735 ASSAY OF MAGNESIUM: CPT | Performed by: EMERGENCY MEDICINE

## 2023-03-31 PROCEDURE — 93005 ELECTROCARDIOGRAM TRACING: CPT | Performed by: EMERGENCY MEDICINE

## 2023-03-31 PROCEDURE — 99284 EMERGENCY DEPT VISIT MOD MDM: CPT

## 2023-03-31 PROCEDURE — 93005 ELECTROCARDIOGRAM TRACING: CPT

## 2023-03-31 PROCEDURE — 84484 ASSAY OF TROPONIN QUANT: CPT | Performed by: EMERGENCY MEDICINE

## 2023-03-31 PROCEDURE — 80053 COMPREHEN METABOLIC PANEL: CPT | Performed by: EMERGENCY MEDICINE

## 2023-03-31 PROCEDURE — 85025 COMPLETE CBC W/AUTO DIFF WBC: CPT

## 2023-03-31 PROCEDURE — 71045 X-RAY EXAM CHEST 1 VIEW: CPT

## 2023-03-31 PROCEDURE — 93010 ELECTROCARDIOGRAM REPORT: CPT | Performed by: INTERNAL MEDICINE

## 2023-03-31 RX ORDER — SODIUM CHLORIDE 0.9 % (FLUSH) 0.9 %
10 SYRINGE (ML) INJECTION AS NEEDED
Status: DISCONTINUED | OUTPATIENT
Start: 2023-03-31 | End: 2023-04-01 | Stop reason: HOSPADM

## 2023-04-01 ENCOUNTER — APPOINTMENT (OUTPATIENT)
Dept: CT IMAGING | Facility: HOSPITAL | Age: 43
End: 2023-04-01
Payer: COMMERCIAL

## 2023-04-01 VITALS
TEMPERATURE: 98.6 F | BODY MASS INDEX: 36.06 KG/M2 | RESPIRATION RATE: 16 BRPM | HEIGHT: 67 IN | DIASTOLIC BLOOD PRESSURE: 60 MMHG | OXYGEN SATURATION: 96 % | WEIGHT: 229.72 LBS | HEART RATE: 61 BPM | SYSTOLIC BLOOD PRESSURE: 90 MMHG

## 2023-04-01 LAB
BILIRUB UR QL STRIP: NEGATIVE
CLARITY UR: CLEAR
COLOR UR: YELLOW
GLUCOSE UR STRIP-MCNC: NEGATIVE MG/DL
HGB UR QL STRIP.AUTO: NEGATIVE
KETONES UR QL STRIP: NEGATIVE
LEUKOCYTE ESTERASE UR QL STRIP.AUTO: NEGATIVE
NITRITE UR QL STRIP: NEGATIVE
PH UR STRIP.AUTO: 7 [PH] (ref 5–8)
PROT UR QL STRIP: NEGATIVE
SP GR UR STRIP: 1.02 (ref 1–1.03)
UROBILINOGEN UR QL STRIP: NORMAL

## 2023-04-01 PROCEDURE — 25010000002 MAGNESIUM SULFATE IN D5W 1G/100ML (PREMIX) 1-5 GM/100ML-% SOLUTION: Performed by: EMERGENCY MEDICINE

## 2023-04-01 PROCEDURE — 25010000002 PROCHLORPERAZINE 10 MG/2ML SOLUTION: Performed by: EMERGENCY MEDICINE

## 2023-04-01 PROCEDURE — 96375 TX/PRO/DX INJ NEW DRUG ADDON: CPT

## 2023-04-01 PROCEDURE — 81003 URINALYSIS AUTO W/O SCOPE: CPT | Performed by: EMERGENCY MEDICINE

## 2023-04-01 PROCEDURE — 70450 CT HEAD/BRAIN W/O DYE: CPT

## 2023-04-01 PROCEDURE — 25010000002 DIPHENHYDRAMINE PER 50 MG: Performed by: EMERGENCY MEDICINE

## 2023-04-01 PROCEDURE — 96365 THER/PROPH/DIAG IV INF INIT: CPT

## 2023-04-01 RX ORDER — PROCHLORPERAZINE EDISYLATE 5 MG/ML
10 INJECTION INTRAMUSCULAR; INTRAVENOUS ONCE
Status: COMPLETED | OUTPATIENT
Start: 2023-04-01 | End: 2023-04-01

## 2023-04-01 RX ORDER — DIPHENHYDRAMINE HYDROCHLORIDE 50 MG/ML
12.5 INJECTION INTRAMUSCULAR; INTRAVENOUS ONCE
Status: COMPLETED | OUTPATIENT
Start: 2023-04-01 | End: 2023-04-01

## 2023-04-01 RX ORDER — MAGNESIUM SULFATE 1 G/100ML
1 INJECTION INTRAVENOUS ONCE
Status: COMPLETED | OUTPATIENT
Start: 2023-04-01 | End: 2023-04-01

## 2023-04-01 RX ADMIN — MAGNESIUM SULFATE 1 G: 1 INJECTION INTRAVENOUS at 00:50

## 2023-04-01 RX ADMIN — PROCHLORPERAZINE EDISYLATE 10 MG: 5 INJECTION INTRAMUSCULAR; INTRAVENOUS at 00:47

## 2023-04-01 RX ADMIN — DIPHENHYDRAMINE HYDROCHLORIDE 12.5 MG: 50 INJECTION, SOLUTION INTRAMUSCULAR; INTRAVENOUS at 00:47

## 2023-04-01 RX ADMIN — SODIUM CHLORIDE 500 ML: 9 INJECTION, SOLUTION INTRAVENOUS at 00:44
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
## 2023-04-03 ENCOUNTER — OFFICE VISIT (OUTPATIENT)
Dept: FAMILY MEDICINE CLINIC | Facility: CLINIC | Age: 43
End: 2023-04-03
Payer: COMMERCIAL

## 2023-04-03 ENCOUNTER — TELEPHONE (OUTPATIENT)
Dept: FAMILY MEDICINE CLINIC | Facility: CLINIC | Age: 43
End: 2023-04-03

## 2023-04-03 VITALS
TEMPERATURE: 97.5 F | SYSTOLIC BLOOD PRESSURE: 124 MMHG | BODY MASS INDEX: 36.13 KG/M2 | WEIGHT: 230.2 LBS | OXYGEN SATURATION: 99 % | DIASTOLIC BLOOD PRESSURE: 78 MMHG | HEART RATE: 70 BPM | HEIGHT: 67 IN

## 2023-04-03 DIAGNOSIS — R11.0 NAUSEA: ICD-10-CM

## 2023-04-03 DIAGNOSIS — R42 DIZZINESS: Primary | ICD-10-CM

## 2023-04-03 DIAGNOSIS — R53.1 LEFT-SIDED WEAKNESS: ICD-10-CM

## 2023-04-03 DIAGNOSIS — R51.9 NONINTRACTABLE EPISODIC HEADACHE, UNSPECIFIED HEADACHE TYPE: ICD-10-CM

## 2023-04-03 DIAGNOSIS — R53.83 LETHARGY: ICD-10-CM

## 2023-04-03 LAB
AMPHET+METHAMPHET UR QL: NEGATIVE
AMPHET+METHAMPHET UR QL: NEGATIVE
AMPHETAMINE INTERNAL CONTROL: ABNORMAL
AMPHETAMINES UR QL: NEGATIVE
BARBITURATE INTERNAL CONTROL: ABNORMAL
BARBITURATES UR QL SCN: NEGATIVE
BARBITURATES UR QL SCN: NEGATIVE
BENZODIAZ UR QL SCN: NEGATIVE
BENZODIAZ UR QL SCN: NEGATIVE
BENZODIAZEPINE INTERNAL CONTROL: ABNORMAL
BUPRENORPHINE INTERNAL CONTROL: ABNORMAL
BUPRENORPHINE SERPL-MCNC: NEGATIVE NG/ML
CANNABINOIDS SERPL QL: POSITIVE
CANNABINOIDS SERPL QL: POSITIVE
COCAINE INTERNAL CONTROL: ABNORMAL
COCAINE UR QL: NEGATIVE
COCAINE UR QL: NEGATIVE
EXPIRATION DATE: ABNORMAL
Lab: ABNORMAL
MDMA (ECSTASY) INTERNAL CONTROL: ABNORMAL
MDMA UR QL SCN: NEGATIVE
METHADONE INTERNAL CONTROL: ABNORMAL
METHADONE UR QL SCN: NEGATIVE
METHADONE UR QL SCN: NEGATIVE
METHAMPHETAMINE INTERNAL CONTROL: ABNORMAL
OPIATES INTERNAL CONTROL: ABNORMAL
OPIATES UR QL: NEGATIVE
OPIATES UR QL: NEGATIVE
OXYCODONE INTERNAL CONTROL: ABNORMAL
OXYCODONE UR QL SCN: NEGATIVE
OXYCODONE UR QL SCN: NEGATIVE
PCP UR QL SCN: NEGATIVE
PHENCYCLIDINE INTERNAL CONTROL: ABNORMAL
THC INTERNAL CONTROL: ABNORMAL

## 2023-04-03 PROCEDURE — 80307 DRUG TEST PRSMV CHEM ANLYZR: CPT
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
## 2023-04-03 NOTE — TELEPHONE ENCOUNTER
PT called in with possible stroke symptoms, LT sided sharp head pains, nausea, loss of vision, LT sided weakness. Instructed PT to go to ER, PT stated she was there on sat. And was sent home, addressed with RN and PCP, PT scheduled for appointment with OPHELIA Ely this morning

## 2023-04-04 LAB — QT INTERVAL: 401 MS

## 2023-04-21 ENCOUNTER — HOSPITAL ENCOUNTER (OUTPATIENT)
Dept: MRI IMAGING | Facility: HOSPITAL | Age: 43
Discharge: HOME | End: 2023-04-21
Payer: COMMERCIAL

## 2023-04-21 DIAGNOSIS — R51.9 NONINTRACTABLE EPISODIC HEADACHE, UNSPECIFIED HEADACHE TYPE: ICD-10-CM

## 2023-04-21 DIAGNOSIS — R42 DIZZINESS: ICD-10-CM

## 2023-04-21 DIAGNOSIS — R53.83 LETHARGY: ICD-10-CM

## 2023-04-21 DIAGNOSIS — R53.1 LEFT-SIDED WEAKNESS: ICD-10-CM

## 2023-04-21 DIAGNOSIS — R11.0 NAUSEA: ICD-10-CM

## 2023-04-21 PROCEDURE — 70544 MR ANGIOGRAPHY HEAD W/O DYE: CPT

## 2023-04-24 ENCOUNTER — TELEPHONE (OUTPATIENT)
Dept: FAMILY MEDICINE CLINIC | Facility: CLINIC | Age: 43
End: 2023-04-24

## 2023-04-24 DIAGNOSIS — R51.9 NONINTRACTABLE EPISODIC HEADACHE, UNSPECIFIED HEADACHE TYPE: ICD-10-CM

## 2023-04-24 DIAGNOSIS — R53.83 LETHARGY: ICD-10-CM

## 2023-04-24 DIAGNOSIS — R42 DIZZINESS: Primary | ICD-10-CM

## 2023-04-24 NOTE — TELEPHONE ENCOUNTER
Caller: Nadiya Rodríguez    Relationship: Self    Best call back number: [phone]    What was the call regarding: PATIENT STATES SHE WOULD LIKE A CALL BACK TO DISCUSS THE NEW MRI ORDERS BECAUSE SHE STATES SHE JUST HAD THOSE ORDERS DONE.    Do you require a callback: YES

## 2023-05-03 ENCOUNTER — OFFICE VISIT (OUTPATIENT)
Dept: FAMILY MEDICINE CLINIC | Facility: CLINIC | Age: 43
End: 2023-05-03
Payer: COMMERCIAL

## 2023-05-03 VITALS
OXYGEN SATURATION: 99 % | BODY MASS INDEX: 36.47 KG/M2 | WEIGHT: 232.4 LBS | HEART RATE: 74 BPM | SYSTOLIC BLOOD PRESSURE: 138 MMHG | HEIGHT: 67 IN | TEMPERATURE: 98.4 F | DIASTOLIC BLOOD PRESSURE: 80 MMHG

## 2023-05-03 DIAGNOSIS — R42 POSTURAL DIZZINESS WITH PRESYNCOPE: ICD-10-CM

## 2023-05-03 DIAGNOSIS — J45.40 MODERATE PERSISTENT ASTHMA WITHOUT COMPLICATION: ICD-10-CM

## 2023-05-03 DIAGNOSIS — R00.2 PALPITATIONS: ICD-10-CM

## 2023-05-03 DIAGNOSIS — G51.4 FACIAL TWITCHING: ICD-10-CM

## 2023-05-03 DIAGNOSIS — R29.818 NEUROLOGIC ABNORMALITY: Primary | ICD-10-CM

## 2023-05-03 DIAGNOSIS — R55 POSTURAL DIZZINESS WITH PRESYNCOPE: ICD-10-CM

## 2023-05-03 DIAGNOSIS — R06.2 WHEEZING: ICD-10-CM

## 2023-05-03 RX ORDER — FLUTICASONE PROPIONATE AND SALMETEROL 250; 50 UG/1; UG/1
1 POWDER RESPIRATORY (INHALATION)
Qty: 60 EACH | Refills: 5 | Status: SHIPPED | OUTPATIENT
Start: 2023-05-03

## 2023-05-03 RX ORDER — SPIRONOLACTONE 25 MG/1
25 TABLET ORAL DAILY
Qty: 90 TABLET | Refills: 0 | Status: SHIPPED | OUTPATIENT
Start: 2023-05-03
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
## 2023-06-08 ENCOUNTER — HOSPITAL ENCOUNTER (EMERGENCY)
Facility: HOSPITAL | Age: 43
Discharge: HOME | End: 2023-06-08
Attending: EMERGENCY MEDICINE
Payer: COMMERCIAL

## 2023-06-08 VITALS
DIASTOLIC BLOOD PRESSURE: 72 MMHG | OXYGEN SATURATION: 98 % | HEART RATE: 66 BPM | SYSTOLIC BLOOD PRESSURE: 126 MMHG | TEMPERATURE: 98.6 F | RESPIRATION RATE: 18 BRPM | WEIGHT: 225 LBS | BODY MASS INDEX: 35.31 KG/M2 | HEIGHT: 67 IN

## 2023-06-08 DIAGNOSIS — R21 RASH AND NONSPECIFIC SKIN ERUPTION: Primary | ICD-10-CM

## 2023-06-08 DIAGNOSIS — L29.9 PRURITUS: ICD-10-CM

## 2023-06-08 PROCEDURE — 96372 THER/PROPH/DIAG INJ SC/IM: CPT

## 2023-06-08 PROCEDURE — 99283 EMERGENCY DEPT VISIT LOW MDM: CPT

## 2023-06-08 PROCEDURE — 25010000002 METHYLPREDNISOLONE PER 125 MG: Performed by: EMERGENCY MEDICINE

## 2023-06-08 RX ORDER — FAMOTIDINE 20 MG/1
20 TABLET, FILM COATED ORAL DAILY
Qty: 6 TABLET | Refills: 0 | Status: SHIPPED | OUTPATIENT
Start: 2023-06-08

## 2023-06-08 RX ORDER — HYDROXYZINE HYDROCHLORIDE 25 MG/1
25 TABLET, FILM COATED ORAL 2 TIMES DAILY
Qty: 12 TABLET | Refills: 0 | Status: SHIPPED | OUTPATIENT
Start: 2023-06-08

## 2023-06-08 RX ORDER — METHYLPREDNISOLONE SODIUM SUCCINATE 125 MG/2ML
125 INJECTION, POWDER, LYOPHILIZED, FOR SOLUTION INTRAMUSCULAR; INTRAVENOUS ONCE
Status: COMPLETED | OUTPATIENT
Start: 2023-06-08 | End: 2023-06-08

## 2023-06-08 RX ORDER — METHYLPREDNISOLONE 4 MG/1
TABLET ORAL
Qty: 21 TABLET | Refills: 0 | Status: SHIPPED | OUTPATIENT
Start: 2023-06-08

## 2023-06-08 RX ORDER — METHYLPREDNISOLONE SODIUM SUCCINATE 125 MG/2ML
125 INJECTION, POWDER, LYOPHILIZED, FOR SOLUTION INTRAMUSCULAR; INTRAVENOUS ONCE
Status: DISCONTINUED | OUTPATIENT
Start: 2023-06-08 | End: 2023-06-08

## 2023-06-08 RX ORDER — FAMOTIDINE 20 MG/1
40 TABLET, FILM COATED ORAL ONCE
Status: COMPLETED | OUTPATIENT
Start: 2023-06-08 | End: 2023-06-08

## 2023-06-08 RX ADMIN — FAMOTIDINE 40 MG: 20 TABLET ORAL at 12:53

## 2023-06-08 RX ADMIN — METHYLPREDNISOLONE SODIUM SUCCINATE 125 MG: 125 INJECTION INTRAMUSCULAR; INTRAVENOUS at 12:52
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
## 2023-06-08 NOTE — DISCHARGE INSTRUCTIONS
Please take medication prescribed today as directed.  Please follow-up with your primary care provider in 5 to 7 days if your rash does not improve with the medicines prescribed you today.  Please return to the ER if you develop any chest pain and shortness of air, feel as if your throat, lips, or tongue is swelling, or if you have any other concern surrounding today's ER visit.

## 2023-06-29 PROBLEM — F32.A ANXIETY AND DEPRESSION: Status: ACTIVE | Noted: 2021-11-15

## 2023-06-29 PROBLEM — F45.9 PSYCHOSOMATIC DISORDER: Status: ACTIVE | Noted: 2023-06-29

## 2023-06-30 ENCOUNTER — TELEPHONE (OUTPATIENT)
Dept: FAMILY MEDICINE CLINIC | Facility: CLINIC | Age: 43
End: 2023-06-30

## 2023-06-30 NOTE — TELEPHONE ENCOUNTER
Caller: Nadiya Rodríguez    Relationship: Self    Best call back number: [phone]     What was the call regarding: PATIENT STATES HER NEUROLOGIST INSTRUCTED HER NOT TO GET THE MRI ON MONDAY, BUT THE PATIENT STATES SHE WOULD LIKE TO KNOW WHAT ADAIR OLIVAS RECOMMENDS SINCE SHE WAS THE ORDERING PHYSICIAN. PATIENT STATES IT IS OKAY TO LEAVE A VOICEMAIL.

## 2023-07-06 ENCOUNTER — TELEPHONE (OUTPATIENT)
Dept: FAMILY MEDICINE CLINIC | Facility: CLINIC | Age: 43
End: 2023-07-06

## 2023-07-06 NOTE — TELEPHONE ENCOUNTER
Pt is returning a call about test results. When calling back if she doesn't answer, you may leave a detailed message.

## 2023-08-04 ENCOUNTER — OFFICE VISIT (OUTPATIENT)
Dept: FAMILY MEDICINE CLINIC | Facility: CLINIC | Age: 43
End: 2023-08-04
Payer: COMMERCIAL

## 2023-08-04 VITALS
DIASTOLIC BLOOD PRESSURE: 78 MMHG | BODY MASS INDEX: 35.79 KG/M2 | OXYGEN SATURATION: 99 % | HEART RATE: 59 BPM | HEIGHT: 67 IN | WEIGHT: 228 LBS | TEMPERATURE: 98.4 F | SYSTOLIC BLOOD PRESSURE: 120 MMHG

## 2023-08-04 DIAGNOSIS — E66.01 CLASS 2 SEVERE OBESITY DUE TO EXCESS CALORIES WITH SERIOUS COMORBIDITY AND BODY MASS INDEX (BMI) OF 35.0 TO 35.9 IN ADULT: ICD-10-CM

## 2023-08-04 DIAGNOSIS — Z00.00 ANNUAL PHYSICAL EXAM: Primary | ICD-10-CM

## 2023-08-04 DIAGNOSIS — E78.5 HYPERLIPIDEMIA, UNSPECIFIED HYPERLIPIDEMIA TYPE: ICD-10-CM

## 2023-08-04 DIAGNOSIS — J45.40 MODERATE PERSISTENT ASTHMA WITHOUT COMPLICATION: ICD-10-CM

## 2023-08-04 DIAGNOSIS — I10 PRIMARY HYPERTENSION: ICD-10-CM

## 2023-08-04 DIAGNOSIS — E11.9 CONTROLLED TYPE 2 DIABETES MELLITUS WITHOUT COMPLICATION, WITHOUT LONG-TERM CURRENT USE OF INSULIN: ICD-10-CM

## 2023-08-04 DIAGNOSIS — R06.2 WHEEZING: ICD-10-CM

## 2023-08-04 DIAGNOSIS — F43.10 PTSD (POST-TRAUMATIC STRESS DISORDER): ICD-10-CM

## 2023-08-04 DIAGNOSIS — J44.9 CHRONIC OBSTRUCTIVE PULMONARY DISEASE, UNSPECIFIED COPD TYPE: ICD-10-CM

## 2023-08-04 LAB
ALBUMIN SERPL-MCNC: 4.2 G/DL (ref 3.5–5.2)
ALBUMIN UR-MCNC: <1.2 MG/DL
ALBUMIN/GLOB SERPL: 1.8 G/DL
ALP SERPL-CCNC: 71 U/L (ref 39–117)
ALT SERPL W P-5'-P-CCNC: 23 U/L (ref 1–33)
ANION GAP SERPL CALCULATED.3IONS-SCNC: 10 MMOL/L (ref 5–15)
AST SERPL-CCNC: 17 U/L (ref 1–32)
BASOPHILS # BLD AUTO: 0.07 10*3/MM3 (ref 0–0.2)
BASOPHILS NFR BLD AUTO: 0.9 % (ref 0–1.5)
BILIRUB SERPL-MCNC: <0.2 MG/DL (ref 0–1.2)
BUN SERPL-MCNC: 13 MG/DL (ref 6–20)
BUN/CREAT SERPL: 16.9 (ref 7–25)
CALCIUM SPEC-SCNC: 9.2 MG/DL (ref 8.6–10.5)
CHLORIDE SERPL-SCNC: 103 MMOL/L (ref 98–107)
CHOLEST SERPL-MCNC: 186 MG/DL (ref 0–200)
CO2 SERPL-SCNC: 25 MMOL/L (ref 22–29)
CREAT SERPL-MCNC: 0.77 MG/DL (ref 0.57–1)
CREAT UR-MCNC: 86.6 MG/DL
DEPRECATED RDW RBC AUTO: 43 FL (ref 37–54)
EGFRCR SERPLBLD CKD-EPI 2021: 97.7 ML/MIN/1.73
EOSINOPHIL # BLD AUTO: 0.3 10*3/MM3 (ref 0–0.4)
EOSINOPHIL NFR BLD AUTO: 3.7 % (ref 0.3–6.2)
ERYTHROCYTE [DISTWIDTH] IN BLOOD BY AUTOMATED COUNT: 12.7 % (ref 12.3–15.4)
GLOBULIN UR ELPH-MCNC: 2.3 GM/DL
GLUCOSE SERPL-MCNC: 100 MG/DL (ref 65–99)
HBA1C MFR BLD: 5.9 % (ref 4.8–5.6)
HCT VFR BLD AUTO: 43 % (ref 34–46.6)
HDLC SERPL-MCNC: 50 MG/DL (ref 40–60)
HGB BLD-MCNC: 14.4 G/DL (ref 12–15.9)
IMM GRANULOCYTES # BLD AUTO: 0.08 10*3/MM3 (ref 0–0.05)
IMM GRANULOCYTES NFR BLD AUTO: 1 % (ref 0–0.5)
LDLC SERPL CALC-MCNC: 116 MG/DL (ref 0–100)
LDLC/HDLC SERPL: 2.27 {RATIO}
LYMPHOCYTES # BLD AUTO: 2.34 10*3/MM3 (ref 0.7–3.1)
LYMPHOCYTES NFR BLD AUTO: 29.1 % (ref 19.6–45.3)
MCH RBC QN AUTO: 31.3 PG (ref 26.6–33)
MCHC RBC AUTO-ENTMCNC: 33.5 G/DL (ref 31.5–35.7)
MCV RBC AUTO: 93.5 FL (ref 79–97)
MICROALBUMIN/CREAT UR: NORMAL MG/G{CREAT}
MONOCYTES # BLD AUTO: 0.53 10*3/MM3 (ref 0.1–0.9)
MONOCYTES NFR BLD AUTO: 6.6 % (ref 5–12)
NEUTROPHILS NFR BLD AUTO: 4.71 10*3/MM3 (ref 1.7–7)
NEUTROPHILS NFR BLD AUTO: 58.7 % (ref 42.7–76)
NRBC BLD AUTO-RTO: 0 /100 WBC (ref 0–0.2)
PLATELET # BLD AUTO: 290 10*3/MM3 (ref 140–450)
PMV BLD AUTO: 10.5 FL (ref 6–12)
POTASSIUM SERPL-SCNC: 4.5 MMOL/L (ref 3.5–5.2)
PROT SERPL-MCNC: 6.5 G/DL (ref 6–8.5)
RBC # BLD AUTO: 4.6 10*6/MM3 (ref 3.77–5.28)
SODIUM SERPL-SCNC: 138 MMOL/L (ref 136–145)
TRIGL SERPL-MCNC: 113 MG/DL (ref 0–150)
TSH SERPL DL<=0.05 MIU/L-ACNC: 1.72 UIU/ML (ref 0.27–4.2)
VLDLC SERPL-MCNC: 20 MG/DL (ref 5–40)
WBC NRBC COR # BLD: 8.03 10*3/MM3 (ref 3.4–10.8)

## 2023-08-04 PROCEDURE — 80061 LIPID PANEL: CPT

## 2023-08-04 PROCEDURE — 80050 GENERAL HEALTH PANEL: CPT

## 2023-08-04 PROCEDURE — 82043 UR ALBUMIN QUANTITATIVE: CPT

## 2023-08-04 PROCEDURE — 83036 HEMOGLOBIN GLYCOSYLATED A1C: CPT

## 2023-08-04 PROCEDURE — 82570 ASSAY OF URINE CREATININE: CPT

## 2023-08-04 RX ORDER — FLUTICASONE FUROATE AND VILANTEROL 100; 25 UG/1; UG/1
1 POWDER RESPIRATORY (INHALATION)
Qty: 14 EACH | Refills: 0 | COMMUNITY
Start: 2023-08-04

## 2023-08-04 RX ORDER — FLUTICASONE FUROATE AND VILANTEROL 100; 25 UG/1; UG/1
1 POWDER RESPIRATORY (INHALATION)
Qty: 60 EACH | Refills: 2 | Status: SHIPPED | OUTPATIENT
Start: 2023-08-04

## 2023-08-17 ENCOUNTER — TELEPHONE (OUTPATIENT)
Dept: FAMILY MEDICINE CLINIC | Facility: CLINIC | Age: 43
End: 2023-08-17
Payer: COMMERCIAL

## 2023-08-17 DIAGNOSIS — I10 PRIMARY HYPERTENSION: Primary | ICD-10-CM

## 2023-08-17 DIAGNOSIS — J44.9 CHRONIC OBSTRUCTIVE PULMONARY DISEASE, UNSPECIFIED COPD TYPE: ICD-10-CM

## 2023-08-17 DIAGNOSIS — J45.40 MODERATE PERSISTENT ASTHMA WITHOUT COMPLICATION: ICD-10-CM

## 2023-08-17 DIAGNOSIS — F51.5 NIGHTMARES: ICD-10-CM

## 2023-08-17 DIAGNOSIS — M54.42 CHRONIC BILATERAL LOW BACK PAIN WITH BILATERAL SCIATICA: ICD-10-CM

## 2023-08-17 DIAGNOSIS — M54.41 CHRONIC BILATERAL LOW BACK PAIN WITH BILATERAL SCIATICA: ICD-10-CM

## 2023-08-17 DIAGNOSIS — G89.29 CHRONIC BILATERAL LOW BACK PAIN WITH BILATERAL SCIATICA: ICD-10-CM

## 2023-08-17 DIAGNOSIS — E78.5 HYPERLIPIDEMIA, UNSPECIFIED HYPERLIPIDEMIA TYPE: ICD-10-CM

## 2023-08-17 DIAGNOSIS — R06.2 WHEEZING: ICD-10-CM

## 2023-08-17 DIAGNOSIS — G47.00 INSOMNIA, UNSPECIFIED TYPE: ICD-10-CM

## 2023-08-17 RX ORDER — SPIRONOLACTONE 25 MG/1
25 TABLET ORAL DAILY
Qty: 90 TABLET | Refills: 0 | Status: SHIPPED | OUTPATIENT
Start: 2023-08-17

## 2023-08-17 NOTE — TELEPHONE ENCOUNTER
Caller: Nadiya Rodríguez    Relationship: Self    Best call back number:     [phone]       What medications are you currently taking:   Current Outpatient Medications on File Prior to Visit   Medication Sig Dispense Refill    Accu-Chek FastClix Lancets misc USE 1 TO CHECK GLUCOSE ONCE DAILY AND AS NEEDED      Accu-Chek Guide test strip USE 1 STRIP TO CHECK GLUCOSE ONCE DAILY AND AS NEEDED      albuterol sulfate  (90 Base) MCG/ACT inhaler Inhale 2 puffs Every 4 (Four) Hours As Needed for Wheezing or Shortness of Air. 18 g 2    aspirin 81 MG chewable tablet Chew 1 tablet Daily.      atorvastatin (Lipitor) 10 MG tablet Take 1 tablet by mouth Daily. 90 tablet 1    Blood Glucose Monitoring Suppl (Accu-Chek Guide Me) w/Device kit USE ONCE DAILY AND AS NEEDED      Fluticasone Furoate-Vilanterol 100-25 MCG/ACT aerosol powder  Inhale 1 puff Daily. 14 each 0    Fluticasone Furoate-Vilanterol 100-25 MCG/ACT aerosol powder  Inhale 1 puff Daily. 60 each 2    Fluticasone-Salmeterol (ADVAIR/WIXELA) 250-50 MCG/ACT DISKUS Inhale 1 puff 2 (Two) Times a Day. 60 each 5    metoprolol tartrate (LOPRESSOR) 100 MG tablet Take 1 tablet by mouth Daily. 90 tablet 0    spironolactone (ALDACTONE) 25 MG tablet Take 1 tablet by mouth Daily. 90 tablet 0    tiZANidine (ZANAFLEX) 4 MG tablet Take 1 tablet by mouth Every 6 (Six) Hours As Needed (back pain). 90 tablet 0    traZODone (DESYREL) 100 MG tablet Take 2 tablets by mouth Every Night for 30 days. 60 tablet 2     No current facility-administered medications on file prior to visit.              Which medication are you concerned about: LORIIO INHALER     Who prescribed you this medication: DEISY    What are your concerns: HEART STARTING RACING REALLY BAD AND HAD TO DO BREATHING TECHNIQUES TO GET IT BREATHING BACK TO NORMAL    PATIENT WANTED TO ADVISE NO LONGER TAKING AND WENT BACK TO TAKING     Fluticasone-Salmeterol (ADVAIR/WIXELA) 250-50 MCG/ACT DISKUS

## 2023-08-18 RX ORDER — FLUTICASONE FUROATE AND VILANTEROL 100; 25 UG/1; UG/1
1 POWDER RESPIRATORY (INHALATION)
Qty: 14 EACH | Refills: 0 | Status: CANCELLED | COMMUNITY
Start: 2023-08-18

## 2023-08-18 RX ORDER — ALBUTEROL SULFATE 90 UG/1
2 AEROSOL, METERED RESPIRATORY (INHALATION) EVERY 4 HOURS PRN
Qty: 18 G | Refills: 2 | Status: SHIPPED | OUTPATIENT
Start: 2023-08-18

## 2023-08-18 RX ORDER — LANCETS
1 EACH MISCELLANEOUS
Qty: 102 EACH | Refills: 5 | Status: SHIPPED | OUTPATIENT
Start: 2023-08-18

## 2023-08-18 RX ORDER — ATORVASTATIN CALCIUM 10 MG/1
10 TABLET, FILM COATED ORAL DAILY
Qty: 90 TABLET | Refills: 1 | Status: SHIPPED | OUTPATIENT
Start: 2023-08-18

## 2023-08-18 RX ORDER — FLUTICASONE PROPIONATE AND SALMETEROL 250; 50 UG/1; UG/1
1 POWDER RESPIRATORY (INHALATION)
Qty: 60 EACH | Refills: 5 | Status: SHIPPED | OUTPATIENT
Start: 2023-08-18

## 2023-08-18 RX ORDER — BLOOD SUGAR DIAGNOSTIC
1 STRIP MISCELLANEOUS
Qty: 200 EACH | Refills: 5 | Status: SHIPPED | OUTPATIENT
Start: 2023-08-18

## 2023-08-18 RX ORDER — ASPIRIN 81 MG/1
81 TABLET, CHEWABLE ORAL DAILY
Qty: 90 TABLET | Refills: 3 | Status: SHIPPED | OUTPATIENT
Start: 2023-08-18

## 2023-08-18 RX ORDER — FLUTICASONE FUROATE AND VILANTEROL 100; 25 UG/1; UG/1
1 POWDER RESPIRATORY (INHALATION)
Qty: 60 EACH | Refills: 2 | Status: SHIPPED | OUTPATIENT
Start: 2023-08-18

## 2023-08-18 RX ORDER — METOPROLOL TARTRATE 100 MG/1
100 TABLET ORAL DAILY
Qty: 90 TABLET | Refills: 1 | Status: SHIPPED | OUTPATIENT
Start: 2023-08-18

## 2023-08-18 RX ORDER — TIZANIDINE 4 MG/1
4 TABLET ORAL EVERY 6 HOURS PRN
Qty: 60 TABLET | Refills: 0 | Status: SHIPPED | OUTPATIENT
Start: 2023-08-18

## 2023-08-18 RX ORDER — TRAZODONE HYDROCHLORIDE 100 MG/1
200 TABLET ORAL NIGHTLY
Qty: 60 TABLET | Refills: 2 | Status: SHIPPED | OUTPATIENT
Start: 2023-08-18 | End: 2023-11-16

## 2023-10-06 DIAGNOSIS — I10 PRIMARY HYPERTENSION: ICD-10-CM

## 2023-10-06 RX ORDER — SPIRONOLACTONE 25 MG/1
25 TABLET ORAL DAILY
Qty: 90 TABLET | Refills: 0 | Status: SHIPPED | OUTPATIENT
Start: 2023-10-06

## 2023-10-06 RX ORDER — METOPROLOL TARTRATE 100 MG/1
100 TABLET ORAL DAILY
Qty: 90 TABLET | Refills: 1 | Status: SHIPPED | OUTPATIENT
Start: 2023-10-06

## 2023-10-06 NOTE — TELEPHONE ENCOUNTER
Caller: MarcosNadiya    Relationship: Self    Best call back number: [phone]    Requested Prescriptions:   Requested Prescriptions     Pending Prescriptions Disp Refills    metoprolol tartrate (LOPRESSOR) 100 MG tablet 90 tablet 1     Sig: Take 1 tablet by mouth Daily.    spironolactone (ALDACTONE) 25 MG tablet 90 tablet 0     Sig: Take 1 tablet by mouth Daily.        Pharmacy where request should be sent: [facility] - [place], [place] - [address] [facility] - [phone]  - [phone] FX     Last office visit with prescribing clinician: 8/4/2023   Last telemedicine visit with prescribing clinician: Visit date not found   Next office visit with prescribing clinician: 11/6/2023     Additional details provided by patient:      Does the patient have less than a 3 day supply:  [x] Yes  [] No    Would you like a call back once the refill request has been completed: [] Yes [x] No    If the office needs to give you a call back, can they leave a voicemail: [] Yes [x] No    Abdoul Villalpando Rep   10/06/23 12:27 EDT

## 2023-12-15 ENCOUNTER — OFFICE VISIT (OUTPATIENT)
Dept: FAMILY MEDICINE CLINIC | Facility: CLINIC | Age: 43
End: 2023-12-15
Payer: COMMERCIAL

## 2023-12-15 VITALS
OXYGEN SATURATION: 98 % | HEIGHT: 67 IN | WEIGHT: 204.9 LBS | BODY MASS INDEX: 32.16 KG/M2 | TEMPERATURE: 98.6 F | DIASTOLIC BLOOD PRESSURE: 66 MMHG | SYSTOLIC BLOOD PRESSURE: 120 MMHG | HEART RATE: 60 BPM

## 2023-12-15 DIAGNOSIS — F51.5 NIGHTMARES: ICD-10-CM

## 2023-12-15 DIAGNOSIS — F32.A ANXIETY AND DEPRESSION: ICD-10-CM

## 2023-12-15 DIAGNOSIS — J45.40 MODERATE PERSISTENT ASTHMA WITHOUT COMPLICATION: ICD-10-CM

## 2023-12-15 DIAGNOSIS — F43.10 PTSD (POST-TRAUMATIC STRESS DISORDER): ICD-10-CM

## 2023-12-15 DIAGNOSIS — Z23 NEED FOR INFLUENZA VACCINATION: ICD-10-CM

## 2023-12-15 DIAGNOSIS — I10 PRIMARY HYPERTENSION: ICD-10-CM

## 2023-12-15 DIAGNOSIS — Z11.3 SCREEN FOR STD (SEXUALLY TRANSMITTED DISEASE): ICD-10-CM

## 2023-12-15 DIAGNOSIS — F41.9 ANXIETY AND DEPRESSION: ICD-10-CM

## 2023-12-15 DIAGNOSIS — Z09 HOSPITAL DISCHARGE FOLLOW-UP: Primary | ICD-10-CM

## 2023-12-15 DIAGNOSIS — Z00.00 ANNUAL PHYSICAL EXAM: ICD-10-CM

## 2023-12-15 DIAGNOSIS — E78.5 HYPERLIPIDEMIA, UNSPECIFIED HYPERLIPIDEMIA TYPE: ICD-10-CM

## 2023-12-15 DIAGNOSIS — G47.00 INSOMNIA, UNSPECIFIED TYPE: ICD-10-CM

## 2023-12-15 DIAGNOSIS — R09.81 NASAL CONGESTION: ICD-10-CM

## 2023-12-15 DIAGNOSIS — E11.9 CONTROLLED TYPE 2 DIABETES MELLITUS WITHOUT COMPLICATION, WITHOUT LONG-TERM CURRENT USE OF INSULIN: ICD-10-CM

## 2023-12-15 DIAGNOSIS — J44.9 CHRONIC OBSTRUCTIVE PULMONARY DISEASE, UNSPECIFIED COPD TYPE: ICD-10-CM

## 2023-12-15 LAB
ALBUMIN SERPL-MCNC: 4.6 G/DL (ref 3.5–5.2)
ALBUMIN/GLOB SERPL: 2 G/DL
ALP SERPL-CCNC: 83 U/L (ref 39–117)
ALT SERPL W P-5'-P-CCNC: 15 U/L (ref 1–33)
ANION GAP SERPL CALCULATED.3IONS-SCNC: 10 MMOL/L (ref 5–15)
AST SERPL-CCNC: 16 U/L (ref 1–32)
BASOPHILS # BLD AUTO: 0.08 10*3/MM3 (ref 0–0.2)
BASOPHILS NFR BLD AUTO: 1 % (ref 0–1.5)
BILIRUB SERPL-MCNC: 0.2 MG/DL (ref 0–1.2)
BUN SERPL-MCNC: 14 MG/DL (ref 6–20)
BUN/CREAT SERPL: 14.4 (ref 7–25)
CALCIUM SPEC-SCNC: 9.9 MG/DL (ref 8.6–10.5)
CHLORIDE SERPL-SCNC: 105 MMOL/L (ref 98–107)
CHOLEST SERPL-MCNC: 132 MG/DL (ref 0–200)
CO2 SERPL-SCNC: 23 MMOL/L (ref 22–29)
CREAT SERPL-MCNC: 0.97 MG/DL (ref 0.57–1)
DEPRECATED RDW RBC AUTO: 40.8 FL (ref 37–54)
EGFRCR SERPLBLD CKD-EPI 2021: 74 ML/MIN/1.73
EOSINOPHIL # BLD AUTO: 0.27 10*3/MM3 (ref 0–0.4)
EOSINOPHIL NFR BLD AUTO: 3.4 % (ref 0.3–6.2)
ERYTHROCYTE [DISTWIDTH] IN BLOOD BY AUTOMATED COUNT: 12.5 % (ref 12.3–15.4)
GLOBULIN UR ELPH-MCNC: 2.3 GM/DL
GLUCOSE SERPL-MCNC: 104 MG/DL (ref 65–99)
HBA1C MFR BLD: 6.1 % (ref 4.8–5.6)
HCT VFR BLD AUTO: 41.4 % (ref 34–46.6)
HDLC SERPL-MCNC: 47 MG/DL (ref 40–60)
HGB BLD-MCNC: 13.2 G/DL (ref 12–15.9)
HIV 1+2 AB+HIV1 P24 AG SERPL QL IA: NORMAL
IMM GRANULOCYTES # BLD AUTO: 0.03 10*3/MM3 (ref 0–0.05)
IMM GRANULOCYTES NFR BLD AUTO: 0.4 % (ref 0–0.5)
LDLC SERPL CALC-MCNC: 73 MG/DL (ref 0–100)
LDLC/HDLC SERPL: 1.57 {RATIO}
LYMPHOCYTES # BLD AUTO: 1.65 10*3/MM3 (ref 0.7–3.1)
LYMPHOCYTES NFR BLD AUTO: 21 % (ref 19.6–45.3)
MCH RBC QN AUTO: 28.8 PG (ref 26.6–33)
MCHC RBC AUTO-ENTMCNC: 31.9 G/DL (ref 31.5–35.7)
MCV RBC AUTO: 90.2 FL (ref 79–97)
MONOCYTES # BLD AUTO: 0.42 10*3/MM3 (ref 0.1–0.9)
MONOCYTES NFR BLD AUTO: 5.3 % (ref 5–12)
NEUTROPHILS NFR BLD AUTO: 5.41 10*3/MM3 (ref 1.7–7)
NEUTROPHILS NFR BLD AUTO: 68.9 % (ref 42.7–76)
NRBC BLD AUTO-RTO: 0 /100 WBC (ref 0–0.2)
PLATELET # BLD AUTO: 378 10*3/MM3 (ref 140–450)
PMV BLD AUTO: 10.6 FL (ref 6–12)
POTASSIUM SERPL-SCNC: 4.8 MMOL/L (ref 3.5–5.2)
PROT SERPL-MCNC: 6.9 G/DL (ref 6–8.5)
RBC # BLD AUTO: 4.59 10*6/MM3 (ref 3.77–5.28)
SODIUM SERPL-SCNC: 138 MMOL/L (ref 136–145)
TRIGL SERPL-MCNC: 55 MG/DL (ref 0–150)
TSH SERPL DL<=0.05 MIU/L-ACNC: 0.39 UIU/ML (ref 0.27–4.2)
VLDLC SERPL-MCNC: 12 MG/DL (ref 5–40)
WBC NRBC COR # BLD AUTO: 7.86 10*3/MM3 (ref 3.4–10.8)

## 2023-12-15 PROCEDURE — 86592 SYPHILIS TEST NON-TREP QUAL: CPT

## 2023-12-15 PROCEDURE — 87591 N.GONORRHOEAE DNA AMP PROB: CPT

## 2023-12-15 PROCEDURE — 87491 CHLMYD TRACH DNA AMP PROBE: CPT

## 2023-12-15 PROCEDURE — 87522 HEPATITIS C REVRS TRNSCRPJ: CPT

## 2023-12-15 PROCEDURE — 86695 HERPES SIMPLEX TYPE 1 TEST: CPT

## 2023-12-15 PROCEDURE — 86696 HERPES SIMPLEX TYPE 2 TEST: CPT

## 2023-12-15 PROCEDURE — 87661 TRICHOMONAS VAGINALIS AMPLIF: CPT

## 2023-12-15 PROCEDURE — G0432 EIA HIV-1/HIV-2 SCREEN: HCPCS

## 2023-12-15 PROCEDURE — 83036 HEMOGLOBIN GLYCOSYLATED A1C: CPT

## 2023-12-15 PROCEDURE — 80061 LIPID PANEL: CPT

## 2023-12-15 PROCEDURE — 80050 GENERAL HEALTH PANEL: CPT

## 2023-12-15 RX ORDER — FLUTICASONE FUROATE AND VILANTEROL 100; 25 UG/1; UG/1
1 POWDER RESPIRATORY (INHALATION)
Qty: 60 EACH | Refills: 2 | Status: SHIPPED | OUTPATIENT
Start: 2023-12-15

## 2023-12-15 RX ORDER — FAMOTIDINE 20 MG/1
TABLET, FILM COATED ORAL
COMMUNITY
Start: 2023-11-16

## 2023-12-15 RX ORDER — ALBUTEROL SULFATE 90 UG/1
2 AEROSOL, METERED RESPIRATORY (INHALATION) EVERY 4 HOURS PRN
Qty: 18 G | Refills: 2 | Status: SHIPPED | OUTPATIENT
Start: 2023-12-15

## 2023-12-15 RX ORDER — FLUOXETINE HYDROCHLORIDE 20 MG/1
20 CAPSULE ORAL DAILY
Qty: 30 CAPSULE | Refills: 2 | Status: SHIPPED | OUTPATIENT
Start: 2023-12-15

## 2023-12-15 RX ORDER — GUAIFENESIN AND DEXTROMETHORPHAN HYDROBROMIDE 600; 30 MG/1; MG/1
1 TABLET, EXTENDED RELEASE ORAL 2 TIMES DAILY
Qty: 20 TABLET | Refills: 0 | Status: SHIPPED | OUTPATIENT
Start: 2023-12-15 | End: 2023-12-25

## 2023-12-15 RX ORDER — SPIRONOLACTONE 25 MG/1
25 TABLET ORAL DAILY
Qty: 90 TABLET | Refills: 0 | Status: SHIPPED | OUTPATIENT
Start: 2023-12-15

## 2023-12-15 RX ORDER — FLUOXETINE HYDROCHLORIDE 20 MG/1
CAPSULE ORAL
COMMUNITY
Start: 2023-11-16 | End: 2023-12-15 | Stop reason: SDUPTHER

## 2023-12-15 RX ORDER — OLANZAPINE 7.5 MG/1
7.5 TABLET, FILM COATED ORAL NIGHTLY
Qty: 30 TABLET | Refills: 2 | Status: SHIPPED | OUTPATIENT
Start: 2023-12-15

## 2023-12-15 RX ORDER — ATORVASTATIN CALCIUM 10 MG/1
10 TABLET, FILM COATED ORAL DAILY
Qty: 90 TABLET | Refills: 1 | Status: SHIPPED | OUTPATIENT
Start: 2023-12-15

## 2023-12-15 RX ORDER — METOPROLOL TARTRATE 100 MG/1
100 TABLET ORAL DAILY
Qty: 90 TABLET | Refills: 1 | Status: SHIPPED | OUTPATIENT
Start: 2023-12-15

## 2023-12-15 RX ORDER — TRAZODONE HYDROCHLORIDE 100 MG/1
200 TABLET ORAL NIGHTLY
Qty: 60 TABLET | Refills: 2 | Status: SHIPPED | OUTPATIENT
Start: 2023-12-15 | End: 2024-03-14

## 2023-12-15 RX ORDER — OLANZAPINE 7.5 MG/1
TABLET, FILM COATED ORAL
COMMUNITY
Start: 2023-11-16 | End: 2023-12-15 | Stop reason: SDUPTHER

## 2023-12-16 LAB — RPR SER QL: NORMAL

## 2023-12-17 LAB
HSV1 IGG SER IA-ACNC: 8.53 INDEX (ref 0–0.9)
HSV2 IGG SER IA-ACNC: <0.91 INDEX (ref 0–0.9)

## 2023-12-18 ENCOUNTER — TELEPHONE (OUTPATIENT)
Dept: BEHAVIORAL HEALTH | Facility: CLINIC | Age: 43
End: 2023-12-18
Payer: COMMERCIAL

## 2023-12-18 ENCOUNTER — TELEPHONE (OUTPATIENT)
Dept: BEHAVIORAL HEALTH | Facility: CLINIC | Age: 43
End: 2023-12-18

## 2023-12-18 LAB
C TRACH RRNA SPEC QL NAA+PROBE: NEGATIVE
N GONORRHOEA RRNA SPEC QL NAA+PROBE: NEGATIVE
T VAGINALIS RRNA SPEC QL NAA+PROBE: NEGATIVE

## 2023-12-19 LAB
HCV RNA SERPL NAA+PROBE-ACNC: 70 IU/ML
HCV RNA SERPL NAA+PROBE-LOG IU: 1.84 LOG10 IU/ML
TEST INFORMATION: NORMAL

## 2024-03-15 ENCOUNTER — OFFICE VISIT (OUTPATIENT)
Dept: FAMILY MEDICINE CLINIC | Facility: CLINIC | Age: 44
End: 2024-03-15
Payer: COMMERCIAL

## 2024-03-15 VITALS
TEMPERATURE: 98.2 F | HEIGHT: 67 IN | BODY MASS INDEX: 32.16 KG/M2 | HEART RATE: 59 BPM | SYSTOLIC BLOOD PRESSURE: 114 MMHG | WEIGHT: 204.9 LBS | OXYGEN SATURATION: 95 % | DIASTOLIC BLOOD PRESSURE: 64 MMHG

## 2024-03-15 DIAGNOSIS — G47.00 INSOMNIA, UNSPECIFIED TYPE: ICD-10-CM

## 2024-03-15 DIAGNOSIS — E78.5 HYPERLIPIDEMIA, UNSPECIFIED HYPERLIPIDEMIA TYPE: ICD-10-CM

## 2024-03-15 DIAGNOSIS — F51.5 NIGHTMARES: ICD-10-CM

## 2024-03-15 DIAGNOSIS — I10 PRIMARY HYPERTENSION: Primary | ICD-10-CM

## 2024-03-15 DIAGNOSIS — J44.9 CHRONIC OBSTRUCTIVE PULMONARY DISEASE, UNSPECIFIED COPD TYPE: ICD-10-CM

## 2024-03-15 DIAGNOSIS — F41.9 ANXIETY AND DEPRESSION: ICD-10-CM

## 2024-03-15 DIAGNOSIS — J45.40 MODERATE PERSISTENT ASTHMA WITHOUT COMPLICATION: ICD-10-CM

## 2024-03-15 DIAGNOSIS — F32.A ANXIETY AND DEPRESSION: ICD-10-CM

## 2024-03-15 DIAGNOSIS — E11.9 CONTROLLED TYPE 2 DIABETES MELLITUS WITHOUT COMPLICATION, WITHOUT LONG-TERM CURRENT USE OF INSULIN: ICD-10-CM

## 2024-03-15 RX ORDER — FLUOXETINE HYDROCHLORIDE 20 MG/1
20 CAPSULE ORAL DAILY
Qty: 30 CAPSULE | Refills: 2 | Status: SHIPPED | OUTPATIENT
Start: 2024-03-15

## 2024-03-15 RX ORDER — ALBUTEROL SULFATE 90 UG/1
2 AEROSOL, METERED RESPIRATORY (INHALATION) EVERY 4 HOURS PRN
Qty: 18 G | Refills: 2 | Status: SHIPPED | OUTPATIENT
Start: 2024-03-15

## 2024-03-15 RX ORDER — OLANZAPINE 7.5 MG/1
7.5 TABLET, FILM COATED ORAL NIGHTLY
Qty: 30 TABLET | Refills: 2 | Status: SHIPPED | OUTPATIENT
Start: 2024-03-15

## 2024-03-15 RX ORDER — TRAZODONE HYDROCHLORIDE 100 MG/1
200 TABLET ORAL NIGHTLY
Qty: 60 TABLET | Refills: 2 | Status: SHIPPED | OUTPATIENT
Start: 2024-03-15 | End: 2024-06-13

## 2024-03-15 RX ORDER — METOPROLOL TARTRATE 100 MG/1
100 TABLET ORAL DAILY
Qty: 90 TABLET | Refills: 1 | Status: SHIPPED | OUTPATIENT
Start: 2024-03-15

## 2024-03-15 RX ORDER — FLUTICASONE FUROATE AND VILANTEROL 100; 25 UG/1; UG/1
1 POWDER RESPIRATORY (INHALATION)
Qty: 60 EACH | Refills: 2 | Status: SHIPPED | OUTPATIENT
Start: 2024-03-15

## 2024-03-15 RX ORDER — SPIRONOLACTONE 25 MG/1
25 TABLET ORAL DAILY
Qty: 90 TABLET | Refills: 0 | Status: SHIPPED | OUTPATIENT
Start: 2024-03-15

## 2024-03-15 RX ORDER — ATORVASTATIN CALCIUM 10 MG/1
10 TABLET, FILM COATED ORAL DAILY
Qty: 90 TABLET | Refills: 1 | Status: SHIPPED | OUTPATIENT
Start: 2024-03-15
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
## 2024-05-20 ENCOUNTER — OFFICE VISIT (OUTPATIENT)
Dept: BEHAVIORAL HEALTH | Facility: CLINIC | Age: 44
End: 2024-05-20
Payer: COMMERCIAL

## 2024-05-20 VITALS
SYSTOLIC BLOOD PRESSURE: 98 MMHG | HEIGHT: 67 IN | BODY MASS INDEX: 32.24 KG/M2 | DIASTOLIC BLOOD PRESSURE: 58 MMHG | WEIGHT: 205.4 LBS

## 2024-05-20 DIAGNOSIS — F41.0 PANIC DISORDER: ICD-10-CM

## 2024-05-20 DIAGNOSIS — G47.00 INSOMNIA, UNSPECIFIED TYPE: ICD-10-CM

## 2024-05-20 DIAGNOSIS — F31.5 BIPOLAR DISORDER, CURRENT EPISODE DEPRESSED, SEVERE, WITH PSYCHOTIC FEATURES: Primary | ICD-10-CM

## 2024-05-20 DIAGNOSIS — F41.1 GENERALIZED ANXIETY DISORDER: ICD-10-CM

## 2024-05-20 DIAGNOSIS — F51.5 NIGHTMARES: ICD-10-CM

## 2024-05-20 PROCEDURE — 99214 OFFICE O/P EST MOD 30 MIN: CPT | Performed by: PHYSICIAN ASSISTANT

## 2024-05-20 PROCEDURE — 1159F MED LIST DOCD IN RCRD: CPT | Performed by: PHYSICIAN ASSISTANT

## 2024-05-20 PROCEDURE — 3078F DIAST BP <80 MM HG: CPT | Performed by: PHYSICIAN ASSISTANT

## 2024-05-20 PROCEDURE — 3074F SYST BP LT 130 MM HG: CPT | Performed by: PHYSICIAN ASSISTANT

## 2024-05-20 PROCEDURE — 1160F RVW MEDS BY RX/DR IN RCRD: CPT | Performed by: PHYSICIAN ASSISTANT

## 2024-05-20 NOTE — PROGRESS NOTES
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 is moderate to severe.  Risk factors include anxiety disorder, mood disorder, family history of daughter with suicide attempt, access to guns, h/o suicide attempt and self harm in the past, AODA, and recent psychosocial stressors (pandemic). Protective factors include no present SI, healthcare seeking, future orientation, willingness to engage in care.  Risk of self-harm chronically is also moderate to severe, but could be further elevated in the event of treatment noncompliance and/or AODA.  Labs/Diagnostics Ordered:   Orders Placed This Encounter   Procedures    Ambulatory Referral to Psychotherapy     Medications:   New Medications Ordered This Visit   Medications    OLANZapine-Samidorphan 5-10 MG tablet     Sig: Take 1 tablet by mouth every night at bedtime.     Dispense:  30 tablet     Refill:  1     Lybalvi, Risks, benefits, alternatives discussed with patient including nausea and vomiting, GI upset, sedation, dizziness/falls risk, akathisia, hypotension, increased appetite, lowering of seizure threshold, theoretical risk of tardive dyskinesia, movement issues. Use care when operating vehicle, vessel, or machine. After discussion of these risks and benefits, the patient voiced understanding and agreed to proceed.    Follow up:   F/u in 1  month    TREATMENT PLAN/GOALS: Continue supportive psychotherapy efforts and medications as indicated. Treatment and medication options discussed during today's visit. Patient ackowledged and verbally consented to continue with current treatment plan and was educated on the importance of compliance with treatment and follow-up appointments.    MEDICATION ISSUES:  CAMMY reviewed as expected.  Discussed medication options and treatment plan of prescribed medication as well as the risks, benefits, and side effects including potential falls, possible impaired driving and metabolic adversities among others. Patient is agreeable to call the office with any worsening of symptoms or onset of side effects. Patient is agreeable to call 911 or go to the nearest ER should he/she begin having SI/HI. No medication side effects or related complaints today.            This document has been electronically signed by Zulma Guerrero PA-C  May 20, 2024 15:57 EDT      Part of this note may be an electronic transcription/translation of spoken language to printed text using the Dragon Dictation System.

## 2024-05-22 ENCOUNTER — TELEPHONE (OUTPATIENT)
Dept: PSYCHIATRY | Facility: CLINIC | Age: 44
End: 2024-05-22
Payer: COMMERCIAL

## 2024-05-22 NOTE — TELEPHONE ENCOUNTER
[place] APPROVAL RECEIVED FROM INSURANCE FOR PTS LYBALVI 5-10 MG TABLETS.    APPROVAL IS ACTIVE UNTIL 05/20/2025.    BEHAVIORAL HE[facility] - SCAN - [place] APPROVAL LYBALVI; MEDIMPACT; 05/21/2024. (05/22/2024)     I CALLED PT TO INFORM OF APPROVAL.  TELEPHONE STATED THAT THIS NUMBER IS NOT A WORKING NUMBER.  [phone].

## 2024-05-24 ENCOUNTER — TELEPHONE (OUTPATIENT)
Dept: PSYCHIATRY | Facility: CLINIC | Age: 44
End: 2024-05-24
Payer: COMMERCIAL

## 2024-05-24 NOTE — TELEPHONE ENCOUNTER
Patient was referred out to the [facility], referral was sent back stating none working numbers, I even tried numbers in chart they are non working numbers, closing out referral order.